# Patient Record
Sex: FEMALE | Race: WHITE | NOT HISPANIC OR LATINO | Employment: OTHER | URBAN - METROPOLITAN AREA
[De-identification: names, ages, dates, MRNs, and addresses within clinical notes are randomized per-mention and may not be internally consistent; named-entity substitution may affect disease eponyms.]

---

## 2020-07-03 ENCOUNTER — APPOINTMENT (OUTPATIENT)
Dept: RADIOLOGY | Facility: IMAGING CENTER | Age: 80
End: 2020-07-03
Attending: FAMILY MEDICINE
Payer: MEDICARE

## 2020-07-03 ENCOUNTER — OFFICE VISIT (OUTPATIENT)
Dept: URGENT CARE | Facility: CLINIC | Age: 80
End: 2020-07-03
Payer: MEDICARE

## 2020-07-03 VITALS
BODY MASS INDEX: 22.69 KG/M2 | RESPIRATION RATE: 14 BRPM | TEMPERATURE: 97.9 F | OXYGEN SATURATION: 94 % | DIASTOLIC BLOOD PRESSURE: 52 MMHG | SYSTOLIC BLOOD PRESSURE: 108 MMHG | WEIGHT: 132.9 LBS | HEART RATE: 68 BPM | HEIGHT: 64 IN

## 2020-07-03 DIAGNOSIS — S49.91XA INJURY OF RIGHT SHOULDER, INITIAL ENCOUNTER: ICD-10-CM

## 2020-07-03 DIAGNOSIS — Z23 NEED FOR VACCINE FOR DT (DIPHTHERIA-TETANUS): ICD-10-CM

## 2020-07-03 DIAGNOSIS — S01.81XA CHIN LACERATION, INITIAL ENCOUNTER: ICD-10-CM

## 2020-07-03 PROCEDURE — 90471 IMMUNIZATION ADMIN: CPT | Performed by: FAMILY MEDICINE

## 2020-07-03 PROCEDURE — 12011 RPR F/E/E/N/L/M 2.5 CM/<: CPT | Performed by: FAMILY MEDICINE

## 2020-07-03 PROCEDURE — 73030 X-RAY EXAM OF SHOULDER: CPT | Mod: TC,RT | Performed by: FAMILY MEDICINE

## 2020-07-03 PROCEDURE — 99213 OFFICE O/P EST LOW 20 MIN: CPT | Mod: 25 | Performed by: FAMILY MEDICINE

## 2020-07-03 PROCEDURE — 90714 TD VACC NO PRESV 7 YRS+ IM: CPT | Performed by: FAMILY MEDICINE

## 2020-07-03 RX ORDER — HYDROCORTISONE 20 MG/1
20 TABLET ORAL DAILY
COMMUNITY
End: 2020-07-04

## 2020-07-03 RX ORDER — CHLORTHALIDONE 25 MG/1
25 TABLET ORAL DAILY
Status: ON HOLD | COMMUNITY
End: 2021-02-24

## 2020-07-03 ASSESSMENT — ENCOUNTER SYMPTOMS
COUGH: 0
SENSORY CHANGE: 0
SORE THROAT: 0
FOCAL WEAKNESS: 1
VOMITING: 0
SHORTNESS OF BREATH: 0
FEVER: 0
TINGLING: 0
FALLS: 1
DIZZINESS: 0
HEADACHES: 0

## 2020-07-03 NOTE — PROGRESS NOTES
Subjective:     Amada Mcduffie is a 80 y.o. female who presents for Laceration (Chin x today; tripped over uneven brick; )     HPI  Pt presents for evaluation of a new problem   Pt with a laceration to her chin after a fall earlier today  Fall was a mechanical fall after tripping on uneven brick  Able to stop the bleeding on her own, however has concerned that she may need stitches as the cut appears pretty deep  Last tetanus unknown, and thinks it is many years ago    During the fall, patient also injured her right shoulder  Having pain in the anterior and posterior shoulder  Has difficulty raising her right shoulder since the injury  Pain stays localized in the shoulder and does not radiate  No significant bruises or lacerations on her shoulder that she is noticed  No headaches since the fall    Review of Systems   Constitutional: Negative for fever.   HENT: Negative for sore throat.    Respiratory: Negative for cough and shortness of breath.    Cardiovascular: Negative for chest pain.   Gastrointestinal: Negative for vomiting.   Musculoskeletal: Positive for falls and joint pain.   Skin: Negative for rash.   Neurological: Positive for focal weakness (Right shoulder ). Negative for dizziness, tingling, sensory change and headaches.     PMH:  has a past medical history of CATARACT, Hypertension, and Unspecified disorder of thyroid.  MEDS:   Current Outpatient Medications:   •  chlorthalidone (HYGROTON) 25 MG Tab, Take 25 mg by mouth every day., Disp: , Rfl:   •  lisinopril (PRINIVIL, ZESTRIL) 40 MG tablet, Take 1 Tab by mouth every day., Disp: 30 Tab, Rfl: 0  •  simvastatin (ZOCOR) 20 MG TABS, Take 1 Tab by mouth every evening., Disp: 30 Tab, Rfl: 0  •  carvedilol (COREG) 25 MG TABS, Take 1 Tab by mouth 2 times a day, with meals., Disp: 60 Tab, Rfl: 0  ALLERGIES:   Allergies   Allergen Reactions   • Morphine    • Pcn [Penicillins]      SURGHX:   Past Surgical History:   Procedure Laterality Date   • CRANIOTOMY   "1/1/2014    Performed by Melanie Pham M.D. at SURGERY [facility]   • GYN SURGERY      hysterectomy   • OTHER ORTHOPEDIC SURGERY      bilateral TKA     SOCHX:  reports that she has never smoked. She has never used smokeless tobacco. She reports that she does not drink alcohol or use drugs.  FH: Family history was reviewed, not contributing to acute injuries      Objective:   /52 (BP Location: Left arm, Patient Position: Sitting)   Pulse 68   Temp 36.6 °C (97.9 °F) (Temporal)   Resp 14   Ht 1.626 m (5' 4\")   Wt 60.3 kg (132 lb 14.4 oz)   SpO2 94%   BMI 22.81 kg/m²     Physical Exam  Constitutional:       General: She is not in acute distress.     Appearance: She is well-developed. She is not diaphoretic.   HENT:      Head: Normocephalic and atraumatic.   Musculoskeletal:      Comments: Right shoulder  General: no gross deformity  ROM: flex 80 active/180 passive, extension 50, ER 60, IR for vertebral height deficit compared to contralateral side  Palpation: TTP along anterior shoulder at coracoid process  Strength: 4/5 abduction, 4/5 ER, 5/5 IR, 5/5 subscapular lift  Rotator Cuff: Empty can +, External rotation lag -  Impingement: Neer’s +  Biceps: Speed’s +  Neuro: Sensation equal and intact bilaterally  Pulses: radial, ulnar 2+   Skin:     General: Skin is warm and dry.      Comments: 2 cm straight laceration over chin which is full-thickness with subcutaneous fat visible, no damage to deeper structures   Neurological:      Mental Status: She is alert and oriented to person, place, and time.   Psychiatric:         Behavior: Behavior normal.         Thought Content: Thought content normal.         Judgment: Judgment normal.     Laceration repair   Area cleaned with Betadine and anesthetized with 1% lidocaine without epinephrine.  Using 4 interrupted sutures of 5-0 Prolene, 2 cm straight laceration on chin repaired with hemostasis and no complications.  Patient tolerated procedure " well    Assessment/Plan:   Assessment    1. Chin laceration, initial encounter  2. Need for vaccine for DT (diphtheria-tetanus)  - TD =>6yo IM    Laceration repaired as above.  Patient will return in 7 to 10 days to have sutures removed.  Last tetanus many years ago and was updated today.  No signs of concussion or significant head injury on exam.      3. Injury of right shoulder, initial encounter  - DX-SHOULDER 2+ RIGHT; Future    Patient with right shoulder injury during the fall.  X-ray shows no fracture or dislocation.  She has significantly limited range of motion which may be at least in part due to pain.  However, high concern that this could be a large rotator cuff tear based on exam.  Patient given some range of motion exercises to start working on and will recheck her range of motion at suture removal visit.  If still having significant range of motion deficits at that visit, would recommend either MRI or orthopedic referral.

## 2020-07-10 ENCOUNTER — OFFICE VISIT (OUTPATIENT)
Dept: URGENT CARE | Facility: CLINIC | Age: 80
End: 2020-07-10
Payer: MEDICARE

## 2020-07-10 VITALS
HEART RATE: 64 BPM | SYSTOLIC BLOOD PRESSURE: 106 MMHG | RESPIRATION RATE: 16 BRPM | HEIGHT: 64 IN | WEIGHT: 132 LBS | OXYGEN SATURATION: 96 % | TEMPERATURE: 96.8 F | BODY MASS INDEX: 22.53 KG/M2 | DIASTOLIC BLOOD PRESSURE: 72 MMHG

## 2020-07-10 DIAGNOSIS — Z48.02 VISIT FOR SUTURE REMOVAL: ICD-10-CM

## 2020-07-10 PROCEDURE — 99212 OFFICE O/P EST SF 10 MIN: CPT | Performed by: PHYSICIAN ASSISTANT

## 2020-07-10 NOTE — PROGRESS NOTES
Exam: Suture/Staple Removal    Placed: 7 days ago    Area: chin    Number of staples/sutures: 4    Patient is seen for suture/staple removal from a laceration. # 4 sutures/staples are intact with no sign of infection or dehiscence. Sutures/staples were removed without incident. Area was covered with ointment and nonstick dressing and bandage.    Follow Up: As needed.

## 2020-07-10 NOTE — PATIENT INSTRUCTIONS
Shoulder Exercises  Ask your health care provider which exercises are safe for you. Do exercises exactly as told by your health care provider and adjust them as directed. It is normal to feel mild stretching, pulling, tightness, or discomfort as you do these exercises. Stop right away if you feel sudden pain or your pain gets worse. Do not begin these exercises until told by your health care provider.  Stretching exercises  External rotation and abduction  This exercise is sometimes called corner stretch. This exercise rotates your arm outward (external rotation) and moves your arm out from your body (abduction).  1.  a doorway with one of your feet slightly in front of the other. This is called a staggered stance. If you cannot reach your forearms to the door frame, stand facing a corner of a room.  2. Choose one of the following positions as told by your health care provider:  ? Place your hands and forearms on the door frame above your head.  ? Place your hands and forearms on the door frame at the height of your head.  ? Place your hands on the door frame at the height of your elbows.  3. Slowly move your weight onto your front foot until you feel a stretch across your chest and in the front of your shoulders. Keep your head and chest upright and keep your abdominal muscles tight.  4. Hold for __________ seconds.  5. To release the stretch, shift your weight to your back foot.  Repeat __________ times. Complete this exercise __________ times a day.  Extension, standing  1. Stand and hold a broomstick, a cane, or a similar object behind your back.  ? Your hands should be a little wider than shoulder width apart.  ? Your palms should face away from your back.  2. Keeping your elbows straight and your shoulder muscles relaxed, move the stick away from your body until you feel a stretch in your shoulders (extension).  ? Avoid shrugging your shoulders while you move the stick. Keep your shoulder blades tucked  down toward the middle of your back.  3. Hold for __________ seconds.  4. Slowly return to the starting position.  Repeat __________ times. Complete this exercise __________ times a day.  Range-of-motion exercises  Pendulum    1. Stand near a wall or a surface that you can hold onto for balance.  2. Bend at the waist and let your left / right arm hang straight down. Use your other arm to support you. Keep your back straight and do not lock your knees.  3. Relax your left / right arm and shoulder muscles, and move your hips and your trunk so your left / right arm swings freely. Your arm should swing because of the motion of your body, not because you are using your arm or shoulder muscles.  4. Keep moving your hips and trunk so your arm swings in the following directions, as told by your health care provider:  ? Side to side.  ? Forward and backward.  ? In clockwise and counterclockwise circles.  5. Continue each motion for __________ seconds, or for as long as told by your health care provider.  6. Slowly return to the starting position.  Repeat __________ times. Complete this exercise __________ times a day.  Shoulder flexion, standing    1. Stand and hold a broomstick, a cane, or a similar object. Place your hands a little more than shoulder width apart on the object. Your left / right hand should be palm up, and your other hand should be palm down.  2. Keep your elbow straight and your shoulder muscles relaxed. Push the stick up with your healthy arm to raise your left / right arm in front of your body, and then over your head until you feel a stretch in your shoulder (flexion).  ? Avoid shrugging your shoulder while you raise your arm. Keep your shoulder blade tucked down toward the middle of your back.  3. Hold for __________ seconds.  4. Slowly return to the starting position.  Repeat __________ times. Complete this exercise __________ times a day.  Shoulder abduction, standing  1. Stand and hold a broomstick,  a cane, or a similar object. Place your hands a little more than shoulder width apart on the object. Your left / right hand should be palm up, and your other hand should be palm down.  2. Keep your elbow straight and your shoulder muscles relaxed. Push the object across your body toward your left / right side. Raise your left / right arm to the side of your body (abduction) until you feel a stretch in your shoulder.  ? Do not raise your arm above shoulder height unless your health care provider tells you to do that.  ? If directed, raise your arm over your head.  ? Avoid shrugging your shoulder while you raise your arm. Keep your shoulder blade tucked down toward the middle of your back.  3. Hold for __________ seconds.  4. Slowly return to the starting position.  Repeat __________ times. Complete this exercise __________ times a day.  Internal rotation    1. Place your left / right hand behind your back, palm up.  2. Use your other hand to dangle an exercise band, a towel, or a similar object over your shoulder. Grasp the band with your left / right hand so you are holding on to both ends.  3. Gently pull up on the band until you feel a stretch in the front of your left / right shoulder. The movement of your arm toward the center of your body is called internal rotation.  ? Avoid shrugging your shoulder while you raise your arm. Keep your shoulder blade tucked down toward the middle of your back.  4. Hold for __________ seconds.  5. Release the stretch by letting go of the band and lowering your hands.  Repeat __________ times. Complete this exercise __________ times a day.  Strengthening exercises  External rotation    1. Sit in a stable chair without armrests.  2. Secure an exercise band to a stable object at elbow height on your left / right side.  3. Place a soft object, such as a folded towel or a small pillow, between your left / right upper arm and your body to move your elbow about 4 inches (10 cm) away  from your side.  4. Hold the end of the exercise band so it is tight and there is no slack.  5. Keeping your elbow pressed against the soft object, slowly move your forearm out, away from your abdomen (external rotation). Keep your body steady so only your forearm moves.  6. Hold for __________ seconds.  7. Slowly return to the starting position.  Repeat __________ times. Complete this exercise __________ times a day.  Shoulder abduction    1. Sit in a stable chair without armrests, or stand up.  2. Hold a __________ weight in your left / right hand, or hold an exercise band with both hands.  3. Start with your arms straight down and your left / right palm facing in, toward your body.  4. Slowly lift your left / right hand out to your side (abduction). Do not lift your hand above shoulder height unless your health care provider tells you that this is safe.  ? Keep your arms straight.  ? Avoid shrugging your shoulder while you do this movement. Keep your shoulder blade tucked down toward the middle of your back.  5. Hold for __________ seconds.  6. Slowly lower your arm, and return to the starting position.  Repeat __________ times. Complete this exercise __________ times a day.  Shoulder extension  1. Sit in a stable chair without armrests, or stand up.  2. Secure an exercise band to a stable object in front of you so it is at shoulder height.  3. Hold one end of the exercise band in each hand. Your palms should face each other.  4. Straighten your elbows and lift your hands up to shoulder height.  5. Step back, away from the secured end of the exercise band, until the band is tight and there is no slack.  6. Squeeze your shoulder blades together as you pull your hands down to the sides of your thighs (extension). Stop when your hands are straight down by your sides. Do not let your hands go behind your body.  7. Hold for __________ seconds.  8. Slowly return to the starting position.  Repeat __________ times.  Complete this exercise __________ times a day.  Shoulder row  1. Sit in a stable chair without armrests, or stand up.  2. Secure an exercise band to a stable object in front of you so it is at waist height.  3. Hold one end of the exercise band in each hand. Position your palms so that your thumbs are facing the ceiling (neutral position).  4. Bend each of your elbows to a 90-degree angle (right angle) and keep your upper arms at your sides.  5. Step back until the band is tight and there is no slack.  6. Slowly pull your elbows back behind you.  7. Hold for __________ seconds.  8. Slowly return to the starting position.  Repeat __________ times. Complete this exercise __________ times a day.  Shoulder press-ups    1. Sit in a stable chair that has armrests. Sit upright, with your feet flat on the floor.  2. Put your hands on the armrests so your elbows are bent and your fingers are pointing forward. Your hands should be about even with the sides of your body.  3. Push down on the armrests and use your arms to lift yourself off the chair. Straighten your elbows and lift yourself up as much as you comfortably can.  ? Move your shoulder blades down, and avoid letting your shoulders move up toward your ears.  ? Keep your feet on the ground. As you get stronger, your feet should support less of your body weight as you lift yourself up.  4. Hold for __________ seconds.  5. Slowly lower yourself back into the chair.  Repeat __________ times. Complete this exercise __________ times a day.  Wall push-ups    1. Stand so you are facing a stable wall. Your feet should be about one arm-length away from the wall.  2. Lean forward and place your palms on the wall at shoulder height.  3. Keep your feet flat on the floor as you bend your elbows and lean forward toward the wall.  4. Hold for __________ seconds.  5. Straighten your elbows to push yourself back to the starting position.  Repeat __________ times. Complete this exercise  __________ times a day.  This information is not intended to replace advice given to you by your health care provider. Make sure you discuss any questions you have with your health care provider.  Document Released: 11/01/2006 Document Revised: 04/10/2020 Document Reviewed: 01/17/2020  Elsevier Patient Education © 2020 [employer]

## 2021-02-10 ENCOUNTER — PRE-ADMISSION TESTING (OUTPATIENT)
Dept: ADMISSIONS | Facility: MEDICAL CENTER | Age: 81
End: 2021-02-10
Attending: SURGERY
Payer: MEDICARE

## 2021-02-10 DIAGNOSIS — Z01.812 PRE-OPERATIVE LABORATORY EXAMINATION: ICD-10-CM

## 2021-02-10 DIAGNOSIS — Z01.810 PRE-OPERATIVE CARDIOVASCULAR EXAMINATION: ICD-10-CM

## 2021-02-10 LAB
ANION GAP SERPL CALC-SCNC: 8 MMOL/L (ref 7–16)
BUN SERPL-MCNC: 15 MG/DL (ref 8–22)
CALCIUM SERPL-MCNC: 9.5 MG/DL (ref 8.5–10.5)
CHLORIDE SERPL-SCNC: 94 MMOL/L (ref 96–112)
CO2 SERPL-SCNC: 30 MMOL/L (ref 20–33)
CREAT SERPL-MCNC: 0.92 MG/DL (ref 0.5–1.4)
EKG IMPRESSION: NORMAL
ERYTHROCYTE [DISTWIDTH] IN BLOOD BY AUTOMATED COUNT: 48.6 FL (ref 35.9–50)
GLUCOSE SERPL-MCNC: 102 MG/DL (ref 65–99)
HCT VFR BLD AUTO: 40.5 % (ref 37–47)
HGB BLD-MCNC: 13.5 G/DL (ref 12–16)
MCH RBC QN AUTO: 31 PG (ref 27–33)
MCHC RBC AUTO-ENTMCNC: 33.3 G/DL (ref 33.6–35)
MCV RBC AUTO: 93.1 FL (ref 81.4–97.8)
PLATELET # BLD AUTO: 249 K/UL (ref 164–446)
PMV BLD AUTO: 10.4 FL (ref 9–12.9)
POTASSIUM SERPL-SCNC: 3.5 MMOL/L (ref 3.6–5.5)
RBC # BLD AUTO: 4.35 M/UL (ref 4.2–5.4)
SODIUM SERPL-SCNC: 132 MMOL/L (ref 135–145)
WBC # BLD AUTO: 4.2 K/UL (ref 4.8–10.8)

## 2021-02-10 PROCEDURE — 36415 COLL VENOUS BLD VENIPUNCTURE: CPT

## 2021-02-10 PROCEDURE — 93010 ELECTROCARDIOGRAM REPORT: CPT | Performed by: INTERNAL MEDICINE

## 2021-02-10 PROCEDURE — 85027 COMPLETE CBC AUTOMATED: CPT

## 2021-02-10 PROCEDURE — 93005 ELECTROCARDIOGRAM TRACING: CPT

## 2021-02-10 PROCEDURE — 80048 BASIC METABOLIC PNL TOTAL CA: CPT

## 2021-02-10 RX ORDER — ANTIARTHRITIC COMBINATION NO.2 900 MG
25 TABLET ORAL DAILY
COMMUNITY

## 2021-02-10 RX ORDER — FUROSEMIDE 20 MG/1
40 TABLET ORAL 2 TIMES DAILY
Status: ON HOLD | COMMUNITY
End: 2021-02-28

## 2021-02-10 RX ORDER — CALCIUM CARBONATE/VITAMIN D3 600 MG-10
2 TABLET ORAL DAILY
COMMUNITY
End: 2021-11-09

## 2021-02-22 ENCOUNTER — APPOINTMENT (OUTPATIENT)
Dept: ADMISSIONS | Facility: MEDICAL CENTER | Age: 81
End: 2021-02-22
Payer: MEDICARE

## 2021-02-22 ENCOUNTER — PRE-ADMISSION TESTING (OUTPATIENT)
Dept: ADMISSIONS | Facility: MEDICAL CENTER | Age: 81
End: 2021-02-22
Attending: SURGERY
Payer: MEDICARE

## 2021-02-22 DIAGNOSIS — Z01.812 PRE-OPERATIVE LABORATORY EXAMINATION: ICD-10-CM

## 2021-02-22 LAB
COVID ORDER STATUS COVID19: NORMAL
SARS-COV-2 RNA RESP QL NAA+PROBE: NOTDETECTED
SPECIMEN SOURCE: NORMAL

## 2021-02-22 PROCEDURE — C9803 HOPD COVID-19 SPEC COLLECT: HCPCS

## 2021-02-22 PROCEDURE — U0005 INFEC AGEN DETEC AMPLI PROBE: HCPCS

## 2021-02-22 PROCEDURE — U0003 INFECTIOUS AGENT DETECTION BY NUCLEIC ACID (DNA OR RNA); SEVERE ACUTE RESPIRATORY SYNDROME CORONAVIRUS 2 (SARS-COV-2) (CORONAVIRUS DISEASE [COVID-19]), AMPLIFIED PROBE TECHNIQUE, MAKING USE OF HIGH THROUGHPUT TECHNOLOGIES AS DESCRIBED BY CMS-2020-01-R: HCPCS

## 2021-02-24 ENCOUNTER — ANESTHESIA EVENT (OUTPATIENT)
Dept: SURGERY | Facility: MEDICAL CENTER | Age: 81
End: 2021-02-24
Payer: MEDICARE

## 2021-02-24 ENCOUNTER — HOSPITAL ENCOUNTER (OUTPATIENT)
Facility: MEDICAL CENTER | Age: 81
End: 2021-02-25
Attending: SURGERY | Admitting: SURGERY
Payer: MEDICARE

## 2021-02-24 ENCOUNTER — ANESTHESIA (OUTPATIENT)
Dept: SURGERY | Facility: MEDICAL CENTER | Age: 81
End: 2021-02-24
Payer: MEDICARE

## 2021-02-24 ENCOUNTER — APPOINTMENT (OUTPATIENT)
Dept: RADIOLOGY | Facility: MEDICAL CENTER | Age: 81
End: 2021-02-24
Attending: SURGERY
Payer: MEDICARE

## 2021-02-24 PROCEDURE — A9270 NON-COVERED ITEM OR SERVICE: HCPCS | Performed by: SURGERY

## 2021-02-24 PROCEDURE — 73020 X-RAY EXAM OF SHOULDER: CPT | Mod: RT

## 2021-02-24 PROCEDURE — 700105 HCHG RX REV CODE 258: Performed by: SURGERY

## 2021-02-24 PROCEDURE — 501838 HCHG SUTURE GENERAL: Performed by: SURGERY

## 2021-02-24 PROCEDURE — 160029 HCHG SURGERY MINUTES - 1ST 30 MINS LEVEL 4: Performed by: SURGERY

## 2021-02-24 PROCEDURE — 160048 HCHG OR STATISTICAL LEVEL 1-5: Performed by: SURGERY

## 2021-02-24 PROCEDURE — 500367 HCHG DRAIN KIT, HEMOVAC: Performed by: SURGERY

## 2021-02-24 PROCEDURE — 700102 HCHG RX REV CODE 250 W/ 637 OVERRIDE(OP): Performed by: SURGERY

## 2021-02-24 PROCEDURE — 770006 HCHG ROOM/CARE - MED/SURG/GYN SEMI*

## 2021-02-24 PROCEDURE — C1776 JOINT DEVICE (IMPLANTABLE): HCPCS | Performed by: SURGERY

## 2021-02-24 PROCEDURE — 502000 HCHG MISC OR IMPLANTS RC 0278: Performed by: SURGERY

## 2021-02-24 PROCEDURE — 502240 HCHG MISC OR SUPPLY RC 0272: Performed by: SURGERY

## 2021-02-24 PROCEDURE — 160002 HCHG RECOVERY MINUTES (STAT): Performed by: SURGERY

## 2021-02-24 PROCEDURE — 700101 HCHG RX REV CODE 250: Performed by: SURGERY

## 2021-02-24 PROCEDURE — 700101 HCHG RX REV CODE 250: Performed by: ANESTHESIOLOGY

## 2021-02-24 PROCEDURE — 700111 HCHG RX REV CODE 636 W/ 250 OVERRIDE (IP): Performed by: ANESTHESIOLOGY

## 2021-02-24 PROCEDURE — 700102 HCHG RX REV CODE 250 W/ 637 OVERRIDE(OP): Performed by: ANESTHESIOLOGY

## 2021-02-24 PROCEDURE — A4565 SLINGS: HCPCS | Performed by: SURGERY

## 2021-02-24 PROCEDURE — 502578 HCHG PACK, TOTAL HIP: Performed by: SURGERY

## 2021-02-24 PROCEDURE — 160035 HCHG PACU - 1ST 60 MINS PHASE I: Performed by: SURGERY

## 2021-02-24 PROCEDURE — A9270 NON-COVERED ITEM OR SERVICE: HCPCS | Performed by: ANESTHESIOLOGY

## 2021-02-24 PROCEDURE — 160009 HCHG ANES TIME/MIN: Performed by: SURGERY

## 2021-02-24 PROCEDURE — 160041 HCHG SURGERY MINUTES - EA ADDL 1 MIN LEVEL 4: Performed by: SURGERY

## 2021-02-24 PROCEDURE — 700111 HCHG RX REV CODE 636 W/ 250 OVERRIDE (IP): Performed by: SURGERY

## 2021-02-24 PROCEDURE — 64415 NJX AA&/STRD BRCH PLXS IMG: CPT | Performed by: SURGERY

## 2021-02-24 PROCEDURE — 160036 HCHG PACU - EA ADDL 30 MINS PHASE I: Performed by: SURGERY

## 2021-02-24 PROCEDURE — C1713 ANCHOR/SCREW BN/BN,TIS/BN: HCPCS | Performed by: SURGERY

## 2021-02-24 DEVICE — IMPLANTABLE DEVICE: Type: IMPLANTABLE DEVICE | Site: SHOULDER | Status: FUNCTIONAL

## 2021-02-24 RX ORDER — ROPIVACAINE HYDROCHLORIDE 5 MG/ML
INJECTION, SOLUTION EPIDURAL; INFILTRATION; PERINEURAL
Status: COMPLETED | OUTPATIENT
Start: 2021-02-24 | End: 2021-02-24

## 2021-02-24 RX ORDER — HYDROMORPHONE HYDROCHLORIDE 1 MG/ML
0.4 INJECTION, SOLUTION INTRAMUSCULAR; INTRAVENOUS; SUBCUTANEOUS
Status: DISCONTINUED | OUTPATIENT
Start: 2021-02-24 | End: 2021-02-24 | Stop reason: HOSPADM

## 2021-02-24 RX ORDER — DEXAMETHASONE SODIUM PHOSPHATE 4 MG/ML
4 INJECTION, SOLUTION INTRA-ARTICULAR; INTRALESIONAL; INTRAMUSCULAR; INTRAVENOUS; SOFT TISSUE
Status: DISCONTINUED | OUTPATIENT
Start: 2021-02-24 | End: 2021-02-25 | Stop reason: HOSPADM

## 2021-02-24 RX ORDER — ACETAMINOPHEN 500 MG
1000 TABLET ORAL ONCE
Status: COMPLETED | OUTPATIENT
Start: 2021-02-24 | End: 2021-02-24

## 2021-02-24 RX ORDER — OXYCODONE HYDROCHLORIDE 10 MG/1
10 TABLET ORAL
Status: DISCONTINUED | OUTPATIENT
Start: 2021-02-24 | End: 2021-02-25 | Stop reason: HOSPADM

## 2021-02-24 RX ORDER — OXYCODONE HYDROCHLORIDE 5 MG/1
5 TABLET ORAL
Status: DISCONTINUED | OUTPATIENT
Start: 2021-02-24 | End: 2021-02-25 | Stop reason: HOSPADM

## 2021-02-24 RX ORDER — ACETAMINOPHEN 500 MG
1000 TABLET ORAL EVERY 6 HOURS PRN
Status: DISCONTINUED | OUTPATIENT
Start: 2021-03-01 | End: 2021-02-25 | Stop reason: HOSPADM

## 2021-02-24 RX ORDER — MIDAZOLAM HYDROCHLORIDE 1 MG/ML
1 INJECTION INTRAMUSCULAR; INTRAVENOUS
Status: DISCONTINUED | OUTPATIENT
Start: 2021-02-24 | End: 2021-02-24 | Stop reason: HOSPADM

## 2021-02-24 RX ORDER — CELECOXIB 200 MG/1
200 CAPSULE ORAL
Status: DISCONTINUED | OUTPATIENT
Start: 2021-03-01 | End: 2021-02-25 | Stop reason: HOSPADM

## 2021-02-24 RX ORDER — POLYETHYLENE GLYCOL 3350 17 G/17G
1 POWDER, FOR SOLUTION ORAL 2 TIMES DAILY PRN
Status: DISCONTINUED | OUTPATIENT
Start: 2021-02-24 | End: 2021-02-25 | Stop reason: HOSPADM

## 2021-02-24 RX ORDER — ONDANSETRON 2 MG/ML
4 INJECTION INTRAMUSCULAR; INTRAVENOUS
Status: DISCONTINUED | OUTPATIENT
Start: 2021-02-24 | End: 2021-02-24 | Stop reason: HOSPADM

## 2021-02-24 RX ORDER — LIDOCAINE HYDROCHLORIDE 40 MG/ML
SOLUTION TOPICAL PRN
Status: DISCONTINUED | OUTPATIENT
Start: 2021-02-24 | End: 2021-02-24 | Stop reason: SURG

## 2021-02-24 RX ORDER — ENEMA 19; 7 G/133ML; G/133ML
1 ENEMA RECTAL
Status: DISCONTINUED | OUTPATIENT
Start: 2021-02-24 | End: 2021-02-25 | Stop reason: HOSPADM

## 2021-02-24 RX ORDER — ONDANSETRON 2 MG/ML
INJECTION INTRAMUSCULAR; INTRAVENOUS PRN
Status: DISCONTINUED | OUTPATIENT
Start: 2021-02-24 | End: 2021-02-24 | Stop reason: SURG

## 2021-02-24 RX ORDER — AMOXICILLIN 250 MG
1 CAPSULE ORAL
Status: DISCONTINUED | OUTPATIENT
Start: 2021-02-24 | End: 2021-02-25 | Stop reason: HOSPADM

## 2021-02-24 RX ORDER — DOCUSATE SODIUM 100 MG/1
100 CAPSULE, LIQUID FILLED ORAL 2 TIMES DAILY
Status: DISCONTINUED | OUTPATIENT
Start: 2021-02-24 | End: 2021-02-25 | Stop reason: HOSPADM

## 2021-02-24 RX ORDER — HALOPERIDOL 5 MG/ML
1 INJECTION INTRAMUSCULAR EVERY 6 HOURS PRN
Status: DISCONTINUED | OUTPATIENT
Start: 2021-02-24 | End: 2021-02-25 | Stop reason: HOSPADM

## 2021-02-24 RX ORDER — AMOXICILLIN 250 MG
1 CAPSULE ORAL NIGHTLY
Status: DISCONTINUED | OUTPATIENT
Start: 2021-02-24 | End: 2021-02-25 | Stop reason: HOSPADM

## 2021-02-24 RX ORDER — ACETAMINOPHEN 500 MG
1000 TABLET ORAL EVERY 6 HOURS
Status: DISCONTINUED | OUTPATIENT
Start: 2021-02-24 | End: 2021-02-25 | Stop reason: HOSPADM

## 2021-02-24 RX ORDER — CEFAZOLIN SODIUM 1 G/3ML
INJECTION, POWDER, FOR SOLUTION INTRAMUSCULAR; INTRAVENOUS PRN
Status: DISCONTINUED | OUTPATIENT
Start: 2021-02-24 | End: 2021-02-24 | Stop reason: SURG

## 2021-02-24 RX ORDER — LIDOCAINE HYDROCHLORIDE 20 MG/ML
JELLY TOPICAL PRN
Status: DISCONTINUED | OUTPATIENT
Start: 2021-02-24 | End: 2021-02-24 | Stop reason: SURG

## 2021-02-24 RX ORDER — HALOPERIDOL 5 MG/ML
1 INJECTION INTRAMUSCULAR
Status: DISCONTINUED | OUTPATIENT
Start: 2021-02-24 | End: 2021-02-24 | Stop reason: HOSPADM

## 2021-02-24 RX ORDER — HYDROMORPHONE HYDROCHLORIDE 1 MG/ML
0.1 INJECTION, SOLUTION INTRAMUSCULAR; INTRAVENOUS; SUBCUTANEOUS
Status: DISCONTINUED | OUTPATIENT
Start: 2021-02-24 | End: 2021-02-24 | Stop reason: HOSPADM

## 2021-02-24 RX ORDER — DIPHENHYDRAMINE HYDROCHLORIDE 50 MG/ML
12.5 INJECTION INTRAMUSCULAR; INTRAVENOUS
Status: DISCONTINUED | OUTPATIENT
Start: 2021-02-24 | End: 2021-02-24 | Stop reason: HOSPADM

## 2021-02-24 RX ORDER — HYDROMORPHONE HYDROCHLORIDE 1 MG/ML
0.2 INJECTION, SOLUTION INTRAMUSCULAR; INTRAVENOUS; SUBCUTANEOUS
Status: DISCONTINUED | OUTPATIENT
Start: 2021-02-24 | End: 2021-02-24 | Stop reason: HOSPADM

## 2021-02-24 RX ORDER — SODIUM CHLORIDE, SODIUM LACTATE, POTASSIUM CHLORIDE, CALCIUM CHLORIDE 600; 310; 30; 20 MG/100ML; MG/100ML; MG/100ML; MG/100ML
INJECTION, SOLUTION INTRAVENOUS CONTINUOUS
Status: ACTIVE | OUTPATIENT
Start: 2021-02-24 | End: 2021-02-24

## 2021-02-24 RX ORDER — CLINDAMYCIN PHOSPHATE 150 MG/ML
INJECTION, SOLUTION INTRAVENOUS PRN
Status: DISCONTINUED | OUTPATIENT
Start: 2021-02-24 | End: 2021-02-24 | Stop reason: SURG

## 2021-02-24 RX ORDER — ONDANSETRON 2 MG/ML
4 INJECTION INTRAMUSCULAR; INTRAVENOUS EVERY 4 HOURS PRN
Status: DISCONTINUED | OUTPATIENT
Start: 2021-02-24 | End: 2021-02-25 | Stop reason: HOSPADM

## 2021-02-24 RX ORDER — DIPHENHYDRAMINE HYDROCHLORIDE 50 MG/ML
25 INJECTION INTRAMUSCULAR; INTRAVENOUS EVERY 6 HOURS PRN
Status: DISCONTINUED | OUTPATIENT
Start: 2021-02-24 | End: 2021-02-25 | Stop reason: HOSPADM

## 2021-02-24 RX ORDER — ROCURONIUM BROMIDE 10 MG/ML
INJECTION, SOLUTION INTRAVENOUS PRN
Status: DISCONTINUED | OUTPATIENT
Start: 2021-02-24 | End: 2021-02-24 | Stop reason: SURG

## 2021-02-24 RX ORDER — CELECOXIB 200 MG/1
200 CAPSULE ORAL DAILY
Status: DISCONTINUED | OUTPATIENT
Start: 2021-02-24 | End: 2021-02-25 | Stop reason: HOSPADM

## 2021-02-24 RX ORDER — DEXAMETHASONE SODIUM PHOSPHATE 4 MG/ML
INJECTION, SOLUTION INTRA-ARTICULAR; INTRALESIONAL; INTRAMUSCULAR; INTRAVENOUS; SOFT TISSUE
Status: COMPLETED | OUTPATIENT
Start: 2021-02-24 | End: 2021-02-24

## 2021-02-24 RX ORDER — SCOLOPAMINE TRANSDERMAL SYSTEM 1 MG/1
1 PATCH, EXTENDED RELEASE TRANSDERMAL
Status: DISCONTINUED | OUTPATIENT
Start: 2021-02-24 | End: 2021-02-25 | Stop reason: HOSPADM

## 2021-02-24 RX ORDER — TRANEXAMIC ACID 100 MG/ML
INJECTION, SOLUTION INTRAVENOUS PRN
Status: DISCONTINUED | OUTPATIENT
Start: 2021-02-24 | End: 2021-02-24 | Stop reason: SURG

## 2021-02-24 RX ORDER — SODIUM CHLORIDE, SODIUM LACTATE, POTASSIUM CHLORIDE, CALCIUM CHLORIDE 600; 310; 30; 20 MG/100ML; MG/100ML; MG/100ML; MG/100ML
INJECTION, SOLUTION INTRAVENOUS CONTINUOUS
Status: DISCONTINUED | OUTPATIENT
Start: 2021-02-24 | End: 2021-02-24 | Stop reason: HOSPADM

## 2021-02-24 RX ORDER — BISACODYL 10 MG
10 SUPPOSITORY, RECTAL RECTAL
Status: DISCONTINUED | OUTPATIENT
Start: 2021-02-24 | End: 2021-02-25 | Stop reason: HOSPADM

## 2021-02-24 RX ORDER — CEFAZOLIN SODIUM 2 G/100ML
2 INJECTION, SOLUTION INTRAVENOUS ONCE
Status: COMPLETED | OUTPATIENT
Start: 2021-02-24 | End: 2021-02-24

## 2021-02-24 RX ORDER — MEPERIDINE HYDROCHLORIDE 25 MG/ML
12.5 INJECTION INTRAMUSCULAR; INTRAVENOUS; SUBCUTANEOUS
Status: DISCONTINUED | OUTPATIENT
Start: 2021-02-24 | End: 2021-02-24 | Stop reason: HOSPADM

## 2021-02-24 RX ORDER — LIDOCAINE HYDROCHLORIDE 20 MG/ML
INJECTION, SOLUTION EPIDURAL; INFILTRATION; INTRACAUDAL; PERINEURAL PRN
Status: DISCONTINUED | OUTPATIENT
Start: 2021-02-24 | End: 2021-02-24 | Stop reason: SURG

## 2021-02-24 RX ADMIN — LIDOCAINE HYDROCHLORIDE 5 ML: 20 INJECTION, SOLUTION EPIDURAL; INFILTRATION; INTRACAUDAL at 10:13

## 2021-02-24 RX ADMIN — SODIUM CHLORIDE, POTASSIUM CHLORIDE, SODIUM LACTATE AND CALCIUM CHLORIDE: 600; 310; 30; 20 INJECTION, SOLUTION INTRAVENOUS at 09:11

## 2021-02-24 RX ADMIN — ROPIVACAINE HYDROCHLORIDE 20 ML: 5 INJECTION, SOLUTION EPIDURAL; INFILTRATION; PERINEURAL at 09:55

## 2021-02-24 RX ADMIN — DEXAMETHASONE SODIUM PHOSPHATE 4 MG: 4 INJECTION, SOLUTION INTRA-ARTICULAR; INTRALESIONAL; INTRAMUSCULAR; INTRAVENOUS; SOFT TISSUE at 10:33

## 2021-02-24 RX ADMIN — FENTANYL CITRATE 50 MCG: 50 INJECTION, SOLUTION INTRAMUSCULAR; INTRAVENOUS at 09:55

## 2021-02-24 RX ADMIN — ROCURONIUM BROMIDE 50 MG: 10 INJECTION, SOLUTION INTRAVENOUS at 10:13

## 2021-02-24 RX ADMIN — DOCUSATE SODIUM 100 MG: 100 CAPSULE, LIQUID FILLED ORAL at 17:40

## 2021-02-24 RX ADMIN — CEFAZOLIN SODIUM 2 G: 2 INJECTION, SOLUTION INTRAVENOUS at 14:52

## 2021-02-24 RX ADMIN — CELECOXIB 200 MG: 200 CAPSULE ORAL at 14:47

## 2021-02-24 RX ADMIN — POVIDONE IODINE 15 ML: 100 SOLUTION TOPICAL at 09:11

## 2021-02-24 RX ADMIN — ACETAMINOPHEN 1000 MG: 500 TABLET ORAL at 20:13

## 2021-02-24 RX ADMIN — OXYCODONE 5 MG: 5 TABLET ORAL at 17:40

## 2021-02-24 RX ADMIN — LIDOCAINE HYDROCHLORIDE 4 ML: 20 JELLY TOPICAL at 10:14

## 2021-02-24 RX ADMIN — CLINDAMYCIN PHOSPHATE 900 MG: 150 INJECTION, SOLUTION INTRAMUSCULAR; INTRAVENOUS at 10:09

## 2021-02-24 RX ADMIN — ONDANSETRON 4 MG: 2 INJECTION INTRAMUSCULAR; INTRAVENOUS at 10:29

## 2021-02-24 RX ADMIN — ACETAMINOPHEN 1000 MG: 500 TABLET ORAL at 09:10

## 2021-02-24 RX ADMIN — FENTANYL CITRATE 25 MCG: 50 INJECTION, SOLUTION INTRAMUSCULAR; INTRAVENOUS at 11:57

## 2021-02-24 RX ADMIN — TRANEXAMIC ACID 1000 MG: 100 INJECTION, SOLUTION INTRAVENOUS at 10:15

## 2021-02-24 RX ADMIN — DOCUSATE SODIUM 50 MG AND SENNOSIDES 8.6 MG 1 TABLET: 8.6; 5 TABLET, FILM COATED ORAL at 20:13

## 2021-02-24 RX ADMIN — CEFAZOLIN 2 G: 330 INJECTION, POWDER, FOR SOLUTION INTRAMUSCULAR; INTRAVENOUS at 10:19

## 2021-02-24 RX ADMIN — ACETAMINOPHEN 1000 MG: 500 TABLET ORAL at 14:46

## 2021-02-24 RX ADMIN — DEXAMETHASONE SODIUM PHOSPHATE 4 MG: 4 INJECTION, SOLUTION INTRA-ARTICULAR; INTRALESIONAL; INTRAMUSCULAR; INTRAVENOUS; SOFT TISSUE at 09:55

## 2021-02-24 RX ADMIN — FENTANYL CITRATE 25 MCG: 50 INJECTION, SOLUTION INTRAMUSCULAR; INTRAVENOUS at 11:45

## 2021-02-24 RX ADMIN — LIDOCAINE HYDROCHLORIDE 4 ML: 40 SOLUTION TOPICAL at 10:14

## 2021-02-24 RX ADMIN — PROPOFOL 150 MG: 10 INJECTION, EMULSION INTRAVENOUS at 10:13

## 2021-02-24 ASSESSMENT — COGNITIVE AND FUNCTIONAL STATUS - GENERAL
DAILY ACTIVITIY SCORE: 20
SUGGESTED CMS G CODE MODIFIER MOBILITY: CJ
WALKING IN HOSPITAL ROOM: A LITTLE
MOBILITY SCORE: 20
CLIMB 3 TO 5 STEPS WITH RAILING: A LITTLE
HELP NEEDED FOR BATHING: A LITTLE
TOILETING: A LITTLE
MOVING TO AND FROM BED TO CHAIR: A LITTLE
DRESSING REGULAR UPPER BODY CLOTHING: A LITTLE
DRESSING REGULAR LOWER BODY CLOTHING: A LITTLE
SUGGESTED CMS G CODE MODIFIER DAILY ACTIVITY: CJ
STANDING UP FROM CHAIR USING ARMS: A LITTLE

## 2021-02-24 ASSESSMENT — PAIN DESCRIPTION - PAIN TYPE
TYPE: SURGICAL PAIN
TYPE: ACUTE PAIN
TYPE: SURGICAL PAIN
TYPE: SURGICAL PAIN
TYPE: ACUTE PAIN
TYPE: ACUTE PAIN
TYPE: SURGICAL PAIN
TYPE: SURGICAL PAIN

## 2021-02-24 ASSESSMENT — PATIENT HEALTH QUESTIONNAIRE - PHQ9
SUM OF ALL RESPONSES TO PHQ9 QUESTIONS 1 AND 2: 0
1. LITTLE INTEREST OR PLEASURE IN DOING THINGS: NOT AT ALL
2. FEELING DOWN, DEPRESSED, IRRITABLE, OR HOPELESS: NOT AT ALL

## 2021-02-24 ASSESSMENT — PAIN SCALES - GENERAL: PAIN_LEVEL: 0

## 2021-02-24 ASSESSMENT — LIFESTYLE VARIABLES
TOTAL SCORE: 0
TOTAL SCORE: 0
HOW MANY TIMES IN THE PAST YEAR HAVE YOU HAD 5 OR MORE DRINKS IN A DAY: 0
ON A TYPICAL DAY WHEN YOU DRINK ALCOHOL HOW MANY DRINKS DO YOU HAVE: 0
CONSUMPTION TOTAL: NEGATIVE
EVER FELT BAD OR GUILTY ABOUT YOUR DRINKING: NO
ALCOHOL_USE: NO
EVER HAD A DRINK FIRST THING IN THE MORNING TO STEADY YOUR NERVES TO GET RID OF A HANGOVER: NO
TOTAL SCORE: 0
HAVE PEOPLE ANNOYED YOU BY CRITICIZING YOUR DRINKING: NO
AVERAGE NUMBER OF DAYS PER WEEK YOU HAVE A DRINK CONTAINING ALCOHOL: 0
DOES PATIENT WANT TO STOP DRINKING: NO
HAVE YOU EVER FELT YOU SHOULD CUT DOWN ON YOUR DRINKING: NO

## 2021-02-24 NOTE — ANESTHESIA PROCEDURE NOTES
Airway    Date/Time: 2/24/2021 10:14 AM  Performed by: Marcos Crews D.O.  Authorized by: Marcos Crews D.O.     Location:  OR  Urgency:  Elective  Indications for Airway Management:  Anesthesia      Spontaneous Ventilation: absent    Sedation Level:  Deep  Preoxygenated: Yes    Patient Position:  Sniffing  Final Airway Type:  Endotracheal airway  Final Endotracheal Airway:  ETT  Cuffed: Yes    Technique Used for Successful ETT Placement:  Direct laryngoscopy    Insertion Site:  Oral  Blade Type:  [place]  Laryngoscope Blade/Videolaryngoscope Blade Size:  3  ETT Size (mm):  7.0  Measured from:  Teeth  ETT to Teeth (cm):  21  Placement Verified by: auscultation and capnometry    Cormack-Lehane Classification:  Grade I - full view of glottis  Number of Attempts at Approach:  1

## 2021-02-24 NOTE — OR NURSING
1126-Pt arrived from OR. Report received. Pt oxygenating well on 4L mask. VSS. Left shoulder dressed with gauze and transparent film. Hemovac in place. Dressing CDI. Per anesthesia pt has known LBBB.    1145- Pt reporting pain. 25mcg fentanyl given. Pt taking sips of water    1157-Pt reporting pain. 25mcg fentanyl given.    1224- Pt eating jello and drinking water.     1243- Pts daughter called and updated.    1250-Report given to SULAIMAN Singleton. All questions answered at this time.    1300-Pt resting comfortably.    1327-Pt transported to new room. All personal belongings with pt.  
COVID-19 Pre-surgery screenin. Do you have an undiagnosed respiratory illness or symptoms such as coughing or sneezing? NO (Yes/No)  a. Onset of Sx -  b. Acute vs. chronic respiratory illness -    2. Do you have an unexplained fever greater than 100.4 degrees Fahrenheit or 38 degrees Celsius?     NO (Yes/No)    3. Have you had direct exposure to a patient who tested positive for Covid-19?    NO (Yes/No)    4. Have you had any loss of your sense of taste or smell? Have you had N/V or sore throat? NO    Patient has been informed of visitor policy and asked to wear a mask upon entering the hospital   YES (Yes/No)      
Unable to offer due to clinical condition

## 2021-02-24 NOTE — ANESTHESIA PREPROCEDURE EVALUATION
Relevant Problems   CARDIAC   (+) HTN (hypertension)       Physical Exam    Airway   Mallampati: II  TM distance: >3 FB  Neck ROM: full       Cardiovascular - normal exam  Rhythm: regular  Rate: normal  (-) murmur     Dental - normal exam           Pulmonary - normal exam  Breath sounds clear to auscultation     Abdominal    Neurological - normal exam                 Anesthesia Plan    ASA 3       Plan - general and peripheral nerve block     Peripheral nerve block will be post-op pain control  Airway plan will be ETT          Induction: intravenous    Postoperative Plan: Postoperative administration of opioids is intended.    Pertinent diagnostic labs and testing reviewed    Informed Consent:    Anesthetic plan and risks discussed with patient.    Use of blood products discussed with: patient whom consented to blood products.

## 2021-02-24 NOTE — ANESTHESIA TIME REPORT
Anesthesia Start and Stop Event Times     Date Time Event    2/24/2021 1005 Ready for Procedure     1008 Anesthesia Start     1127 Anesthesia Stop        Responsible Staff  02/24/21    Name Role Begin End    Marcos Crews D.O. Anesth 1008 1127        Preop Diagnosis (Free Text):  Pre-op Diagnosis     PAIN IN RIGHT SHOULDER        Preop Diagnosis (Codes):    Post op Diagnosis  Shoulder pain, right      Premium Reason  Non-Premium    Comments:

## 2021-02-24 NOTE — OP REPORT
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  Right reverse total shoulder arthroplasty.  2.  Right open biceps tenodesis.       ANESTHESIOLOGIST: Dr. Mars MD     ANESTHESIA:  General with upper extremity nerve block for pain control.     COMPLICATIONS:  None noted.     DRAINS:  Hemovac x1.     SPECIMENS:  None.     ESTIMATED BLOOD LOSS:  100 mL    IMPLANTS: Integra Titan Reverse Total shoulder arthroplasty system; 10mm pres-fit stem; 3mm polyethylene; 38mm x 5mm lateralized glenosphere with a 13mm base plate with multiple locking and non-locking screws.      INDICATIONS:      This patient is well known to me through my   outpatient clinic for the above-mentioned diagnosis. The patient believes and I would agree they have failed conservative treatment and are a candidate for the   above-mentioned procedure.  Prior to the procedure, the patient understood the risks,   benefits, and alternatives to surgery.  The patient understood the risks to include,   but not limited to the risk of infection requiring repeat surgery, bleeding   requiring blood transfusion, nerve, blood vessel, tendon injury requiring   repair, chronic pain, periprosthetic fracture subsidence, loosening or   dislocation requiring revision surgery, DVT, pulmonary embolism, heart attack,   stroke, and even death.  The patient states despite these risks, the patient wished   to proceed with surgery.     DESCRIPTION OF PROCEDURE:  The patient was met in the preoperative holding   area.  The right shoulder was initialed as correct operative site.  The patient had an   opportunity to ask questions, all questions were answered and informed consent was obtained.     The patient was brought to the operating room and laid  supine on the operating   room table.  All bony and dependent prominences were well-padded.  Upper   extremity nerve block and general anesthesia were induced without any   complication.  Ancef and clindamycin were administered for infection   prophylaxis.  The patient was placed in well-padded beach chair position.  The right   upper extremity was prepped and draped in the usual sterile fashion.  A formal   time-out was performed, in which all parties agreed upon the correct patient,   procedure, and operative site.  I began the procedure by making an oblique   incision in the anterior aspect of the shoulder for a deltopectoral approach.    I identified the cephalic vein, retracted it medially, and cauterized lateral   branches.  I released the upper third of pectoralis major tendon to help   mobilize the shoulder.  Deep to this, I identified the long head of biceps   Tendon, which was very tendonopathic.  Under resting tension, I performed open biceps tenodesis to the upper   half of the pectoralis major tendon with 0 Ethibond suture to prevent painful   mica deformity and subluxation into the implant.  I then excised the   proximal aspect of the tendon including the labral insertion.  I then   performed a longitudinal subscapularis tenotomy while palpating and protecting   the axillary nerve.  I tagged it for later repair, dislocated the shoulder and noted significant arthritis. Osteophytes were removed with a rongeur. I then performed an anatomic neck cut at 30 degrees retroversion.  supraspinatus was torn. I then placed glenoid retractors, excised the labrum and released the capsule circumferentially around the glenoid while palpating and protecting the axillary nerve.  I placed the guide and guidewire adjusting to her anatomy. I then drilled for the central peg.  Using the Titan system, I placed 13 mm baseplate with multiple locking and nonlocking screws and a 38 mm x 5 mm lateralized glenosphere, which had    excellent fixation of the scapula.  I turned my attention back to the humerus,   broached up to the above mentioned stem size, which had excellent press fit and stability.  I reamed the metaphysis and placed the implant, standard body,  stem press fit. I trialed  Polyethylene sized up to the above mentioned size, which had excellent range of motion and stability.  I placed this final component after copious irrigation of the   joint.  I then repaired subscapularis tenotomy with #5 Ti-Cron suture, took   the shoulder through range of motion.  There was excellent range of motion and   stability.  I then placed one deep Hemovac drain, closed the skin with 2-0   Monocryl suture and skin stapler, all covered with sterile Xeroform, 4x4s,   Ioban, and a sling.       The patient was transferred in stable condition to PACU where there were no   immediate post-term complaints.     POSTOPERATIVE PLAN:  The patient will be admitted overnight for pain control   and will be discharged home, likely on postoperative day #1.

## 2021-02-24 NOTE — ANESTHESIA POSTPROCEDURE EVALUATION
Patient: Amada Mcduffie    Procedure Summary     Date: 02/24/21 Room / Location: [facility] ROOM 21 / SURGERY SAME DAY [facility]    Anesthesia Start: 1008 Anesthesia Stop: 1127    Procedure: ARTHROPLASTY, SHOULDER, TOTAL - REVERSE. (Right Shoulder) Diagnosis: (PAIN IN RIGHT SHOULDER)    Surgeons: Rober Lunsford M.D. Responsible Provider: Marcos Crews D.O.    Anesthesia Type: general, peripheral nerve block ASA Status: 3          Final Anesthesia Type: general, peripheral nerve block  Last vitals  BP   Blood Pressure : (!) 181/87    Temp   36.4 °C (97.6 °F)    Pulse   (!) 57   Resp   16    SpO2   95 %      Anesthesia Post Evaluation    Patient location during evaluation: PACU  Patient participation: complete - patient participated  Level of consciousness: awake and alert  Pain score: 0    Airway patency: patent  Anesthetic complications: no  Cardiovascular status: hemodynamically stable  Respiratory status: acceptable  Hydration status: euvolemic    PONV: none          There were no known complications for this encounter.     Nurse Pain Score: 0 (NPRS)

## 2021-02-24 NOTE — ANESTHESIA PROCEDURE NOTES
Peripheral Block    Date/Time: 2/24/2021 9:55 AM  Performed by: Marcos Crews D.O.  Authorized by: Marcos Crews D.O.     Start Time:  2/24/2021 9:55 AM  End Time:  2/24/2021 10:01 AM  Reason for Block: at surgeon's request and post-op pain management ONLY    patient identified, IV checked, site marked, risks and benefits discussed, surgical consent, monitors and equipment checked, pre-op evaluation and timeout performed    Patient Position:  Supine  Prep: ChloraPrep    Monitoring:  Heart rate, continuous pulse ox and cardiac monitor  Block Region:  Upper Extremity  Upper Extremity - Block Type:  BRACHIAL PLEXUS block, Interscalene approach and other  Other Block Type: Right Superficial Cervical Plexus Block  Laterality:  Right  Procedures: ultrasound guided  Image captured, interpreted and electronically stored.  Local Infiltration:  Lidocaine  Strength:  1 %  Dose:  3 ml  Block Type:  Single-shot  Needle Length:  50mm  Needle Gauge:  22 G  Needle Localization:  Ultrasound guidance  Injection Assessment:  Negative aspiration for heme, no paresthesia on injection, incremental injection and local visualized surrounding nerve on ultrasound   Interscalene block volume 12 mL    Superficial Cervical Plexus block volume 9 mL

## 2021-02-24 NOTE — CARE PLAN
Problem: Safety  Goal: Will remain free from falls  Outcome: PROGRESSING AS EXPECTED  Note: Bed in lowest position, locked, upper side rails, treaded socks in place, bed near nursing station, educated about using the call light     Problem: Pain Management  Goal: Pain level will decrease to patient's comfort goal  Outcome: PROGRESSING AS EXPECTED  Note: Educated about pharmacological and non pharmacological pain methods, check the MAR

## 2021-02-25 VITALS
OXYGEN SATURATION: 93 % | SYSTOLIC BLOOD PRESSURE: 164 MMHG | TEMPERATURE: 97.9 F | HEART RATE: 65 BPM | WEIGHT: 137.57 LBS | BODY MASS INDEX: 23.49 KG/M2 | DIASTOLIC BLOOD PRESSURE: 72 MMHG | HEIGHT: 64 IN | RESPIRATION RATE: 18 BRPM

## 2021-02-25 PROCEDURE — A9270 NON-COVERED ITEM OR SERVICE: HCPCS | Performed by: SURGERY

## 2021-02-25 PROCEDURE — 700102 HCHG RX REV CODE 250 W/ 637 OVERRIDE(OP): Performed by: SURGERY

## 2021-02-25 PROCEDURE — 700111 HCHG RX REV CODE 636 W/ 250 OVERRIDE (IP): Performed by: PHYSICIAN ASSISTANT

## 2021-02-25 PROCEDURE — G0378 HOSPITAL OBSERVATION PER HR: HCPCS

## 2021-02-25 RX ORDER — ONDANSETRON 4 MG/1
4 TABLET, FILM COATED ORAL EVERY 4 HOURS PRN
Qty: 5 TABLET | Refills: 3 | Status: SHIPPED | OUTPATIENT
Start: 2021-02-25 | End: 2021-03-12

## 2021-02-25 RX ORDER — ONDANSETRON 4 MG/1
4 TABLET, ORALLY DISINTEGRATING ORAL EVERY 4 HOURS PRN
Status: DISCONTINUED | OUTPATIENT
Start: 2021-02-25 | End: 2021-02-25 | Stop reason: HOSPADM

## 2021-02-25 RX ADMIN — ACETAMINOPHEN 1000 MG: 500 TABLET ORAL at 01:44

## 2021-02-25 RX ADMIN — CELECOXIB 200 MG: 200 CAPSULE ORAL at 05:50

## 2021-02-25 RX ADMIN — OXYCODONE 5 MG: 5 TABLET ORAL at 05:50

## 2021-02-25 RX ADMIN — DOCUSATE SODIUM 100 MG: 100 CAPSULE, LIQUID FILLED ORAL at 05:50

## 2021-02-25 RX ADMIN — OXYCODONE 5 MG: 5 TABLET ORAL at 01:44

## 2021-02-25 RX ADMIN — ONDANSETRON 4 MG: 4 TABLET, ORALLY DISINTEGRATING ORAL at 10:59

## 2021-02-25 RX ADMIN — OXYCODONE 5 MG: 5 TABLET ORAL at 10:59

## 2021-02-25 ASSESSMENT — PAIN DESCRIPTION - PAIN TYPE
TYPE: ACUTE PAIN
TYPE: ACUTE PAIN
TYPE: SURGICAL PAIN
TYPE: SURGICAL PAIN

## 2021-02-25 NOTE — PROGRESS NOTES
Mobility Progress Note    Surgery patient: Yes  Date of surgery: 02/24/2021   Ambulated 50 ft on day of surgery? (N/A if patient did not undergo surgery today): Yes  Number of times ambulated 50 feet or greater today: 1  Patient has been up to chair, edge of bed or HOB 90 degrees for all meals?: Yes  Goal met? (goal is ambulating at least 50 feet 2 times on day shift, one time on night shift): n/a, ambulated once so far on day of surgery

## 2021-02-25 NOTE — DISCHARGE INSTRUCTIONS
Discharge Instructions    Discharged to home by car with relative. Discharged via wheelchair, hospital escort: Yes.  Special equipment needed: Not Applicable    Be sure to schedule a follow-up appointment with your primary care doctor or any specialists as instructed.     Discharge Plan:   Diet Plan: Discussed  Activity Level: Discussed  Confirmed Follow up Appointment: Patient to Call and Schedule Appointment  Confirmed Symptoms Management: Discussed  Medication Reconciliation Updated: Yes  Influenza Vaccine Indication: Patient Refuses    I understand that a diet low in cholesterol, fat, and sodium is recommended for good health. Unless I have been given specific instructions below for another diet, I accept this instruction as my diet prescription.   Other diet: regular    Special Instructions: Discharge instructions for the Orthopedic Patient    Follow up with Primary Care Physician within 2 weeks of discharge to home, regarding:  Review of medications and diagnostic testing.  Surveillance for medical complications.  Workup and treatment of osteoporosis, if appropriate.     -Is this a Hip/Knee/Shoulder Joint Replacement patient? Yes   SHOULDER REPLACEMENT, AFTER-CARE GUIDELINES     These instructions provide you with information on caring for yourself and your shoulder after surgery. Your health care provider may also give you instructions that are more specific. Your treatment has been planned and performed according to current medical practices but problems sometimes occur. Call your health care provider if you have any problems or questions.     WHAT TO EXPECT AFTER THE PROCEDURE   After your procedure, your shoulder and arm will typically be stiff, sore, and bruised. This will improve over time.     HOME CARE INSTRUCTIONS   · Follow your home pain management plan as discussed with your nurse and as directed by your provider.   · It is important to follow any scheduled pain medications as directed for maximal  pain relief.   · If prescribed opioid medication, the goal is to use opioids ONLY IF NEEDED and to wean off prescription pain medicine as soon as possible.   · Apply ice to the injured area for several days after surgery:   · Put ice in a plastic bag.   · Place a towel between your skin and the bag.   · Leave the ice on for 20 minutes, 2-3 times a day at a minimum.   · You may resume your normal diet and activities as directed by your provider.   · Your arm will be in a sling. You will need to wear this for 4-6 weeks after surgery.   · It may be more comfortable to sleep in a recliner for several days or weeks after surgery.   · Do not use your arm to push yourself up in bed or from a chair.   · If prescribed a home exercise plan, follow the program of home exercises as suggested by your provider and/or occupational therapist. Do the exercises at least 3x daily as directed.   · Do not lift anything heavier than a cup of coffee for the first 6 weeks after surgery.   · Ask for help. If you do not have adequate assistance at home, please seek advice from your provider about home care or other services.   · Change dressings only if necessary and as directed. Keep wound dry and covered until otherwise directed by your provider.   · Make sure that you have a follow-up appointment with your provider after surgery.     SEEK URGENT MEDICAL CARE IF:   · You have redness, swelling, or increased pain at your operative site.   · You see pus coming from the wound.   · You have a fever greater than 100.5° F.   · You notice a bad smell coming from the wound or dressing.   · The edges of the wound break open after suture or staple removal.   · You have increasing pain with movement of the shoulder.   · You develop a rash.   · You have chest pain or shortness of breath.     This information is not intended to replace advice given to you by your health care provider. Please discuss any specific questions you have with your health care  provider.       -Is this patient being discharged with medication to prevent blood clots?  No    · Is patient discharged on Warfarin / Coumadin?   No     Depression / Suicide Risk    As you are discharged from this [facility] facility, it is important to learn how to keep safe from harming yourself.    Recognize the warning signs:  · Abrupt changes in personality, positive or negative- including increase in energy   · Giving away possessions  · Change in eating patterns- significant weight changes-  positive or negative  · Change in sleeping patterns- unable to sleep or sleeping all the time   · Unwillingness or inability to communicate  · Depression  · Unusual sadness, discouragement and loneliness  · Talk of wanting to die  · Neglect of personal appearance   · Rebelliousness- reckless behavior  · Withdrawal from people/activities they love  · Confusion- inability to concentrate     If you or a loved one observes any of these behaviors or has concerns about self-harm, here's what you can do:  · Talk about it- your feelings and reasons for harming yourself  · Remove any means that you might use to hurt yourself (examples: pills, rope, extension cords, firearm)  · Get professional help from the community (Mental Health, Substance Abuse, psychological counseling)  · Do not be alone:Call your Safe Contact- someone whom you trust who will be there for you.  · Call your local CRISIS HOTLINE 132-9201 or 388-270-9890  · Call your local Children's Mobile Crisis Response Team [place] (866) 471-9059 or www.IntroMaps  · Call the toll free National Suicide Prevention Hotlines   · National Suicide Prevention Lifeline 928-406-DKYZ (6809)  · National Hope Line Network 800-SUICIDE (851-3192)      ACTIVITY: Rest and take it easy for the first 24 hours.  A responsible adult is recommended to remain with you during that time.  It is normal to feel sleepy.  We encourage you to not do anything that requires balance,  judgment or coordination.    MILD FLU-LIKE SYMPTOMS ARE NORMAL. YOU MAY EXPERIENCE GENERALIZED MUSCLE ACHES, THROAT IRRITATION, HEADACHE AND/OR SOME NAUSEA.    FOR 24 HOURS DO NOT:  Drive, operate machinery or run household appliances.  Drink beer or alcoholic beverages.   Make important decisions or sign legal documents.    SPECIAL INSTRUCTIONS:     Keep Sling on at all times until clinic followup except for gentle elbow motion and hygeine.   Sedentary use only of hand, no shoulder motion until then.     No driving while on narcotic pain medications. Contact auto-insurance carrier for clearance to begin driving again; wait at least 6 weeks.     DIET: To avoid nausea, slowly advance diet as tolerated, avoiding spicy or greasy foods for the first day.  Add more substantial food to your diet according to your physician's instructions.  Babies can be fed formula or breast milk as soon as they are hungry.  INCREASE FLUIDS AND FIBER TO AVOID CONSTIPATION.    SURGICAL DRESSING/BATHING:  Okay to remove bandage in 5 days (Monday) and cover incision with bandaids and get wet. Dressing must stay clean and dry until them.    FOLLOW-UP APPOINTMENT:  A follow-up appointment should be arranged with your doctor; call to schedule.    You should CALL YOUR PHYSICIAN if you develop:  Fever greater than 101 degrees F.  Pain not relieved by medication, or persistent nausea or vomiting.  Excessive bleeding (blood soaking through dressing) or unexpected drainage from the wound.  Extreme redness or swelling around the incision site, drainage of pus or foul smelling drainage.  Inability to urinate or empty your bladder within 8 hours.  Problems with breathing or chest pain.    You should call 911 if you develop problems with breathing or chest pain.  If you are unable to contact your doctor or surgical center, you should go to the nearest emergency room or urgent care center.  Physician's telephone #:   Dr. Lunsford [phone]    If  any questions arise, call your doctor.  If your doctor is not available, please feel free to call the Surgical Center at [phone]. The Contact Center is open Monday through Friday 7AM to 5PM and may speak to a nurse at [phone], or toll free at [phone].     A registered nurse may call you a few days after your surgery to see how you are doing after your procedure.    MEDICATIONS: Resume taking daily medication.  Take prescribed pain medication with food.  If no medication is prescribed, you may take non-aspirin pain medication if needed.  PAIN MEDICATION CAN BE VERY CONSTIPATING.  Take a stool softener or laxative such as senokot, pericolace, or milk of magnesia if needed.        If your physician has prescribed pain medication that includes Acetaminophen (Tylenol), do not take additional Acetaminophen (Tylenol) while taking the prescribed medication.    Depression / Suicide Risk    As you are discharged from this [facility] facility, it is important to learn how to keep safe from harming yourself.    Recognize the warning signs:  · Abrupt changes in personality, positive or negative- including increase in energy   · Giving away possessions  · Change in eating patterns- significant weight changes-  positive or negative  · Change in sleeping patterns- unable to sleep or sleeping all the time   · Unwillingness or inability to communicate  · Depression  · Unusual sadness, discouragement and loneliness  · Talk of wanting to die  · Neglect of personal appearance   · Rebelliousness- reckless behavior  · Withdrawal from people/activities they love  · Confusion- inability to concentrate     If you or a loved one observes any of these behaviors or has concerns about self-harm, here's what you can do:  · Talk about it- your feelings and reasons for harming yourself  · Remove any means that you might use to hurt yourself (examples: pills, rope, extension cords, firearm)  · Get professional help from the  community (Mental Health, Substance Abuse, psychological counseling)  · Do not be alone:Call your Safe Contact- someone whom you trust who will be there for you.  · Call your local CRISIS HOTLINE 038-6358 or 571-052-8256  · Call your local Children's Mobile Crisis Response Team [place] (471) 996-5381 or www.Portero  · Call the toll free National Suicide Prevention Hotlines   · National Suicide Prevention Lifeline 388-492-ELNC (6181)  · National Hope Line Network 800-SUICIDE (806-3060)      Shoulder Joint Replacement, Care After  This sheet gives you information about how to care for yourself after your procedure. Your health care provider may also give you more specific instructions. If you have problems or questions, contact your health care provider.  What can I expect after the procedure?  After your procedure, it is common to have:  · A bruised and stiff shoulder.  · A bruised and stiff arm.  · Some pain.  Follow these instructions at home:  If you have a sling:    · Wear the sling as told by your health care provider. Remove it only as told by your health care provider.  · Loosen the sling if your fingers tingle, become numb, or turn cold and blue.  · Keep the sling clean.  · If the sling is not waterproof:  ? Do not let it get wet.  ? Cover it with a watertight covering when you take a bath or a shower.  Bathing  · Do not take baths, swim, or use a hot tub until your health care provider approves. Ask your health care provider if you can take showers. You may only be allowed to take sponge baths for bathing.  · If your sling is not waterproof, cover it with a watertight covering when you take a bath or a shower.  · Keep the bandage (dressing) dry until your health care provider says it can be removed.  Managing pain, stiffness, and swelling  · If directed, put ice on the affected area.  ? If you have a removable sling, remove it as told by your health care provider.  ? Put ice in a plastic  bag.  ? Place a towel between your skin and the bag.  ? Leave the ice on for 20 minutes, 2-3 times a day.  · Move your fingers often to avoid stiffness and to lessen swelling.  Driving  · Do not drive or use heavy machinery while taking prescription pain medicine.  · Do not drive for 2-4 weeks after surgery or as told by your health care provider.  Medicine  · Take over-the-counter and prescription medicines only as told by your health care provider.  · If you were prescribed an antibiotic medicine, use it as told by your health care provider. Do not stop using the antibiotic even if you start to feel better.  Incision care  · Follow instructions from your health care provider about how to take care of your incision area. Make sure you:  ? Wash your hands with soap and water before you change your bandage (dressing). If soap and water are not available, use hand .  ? Change your dressing as told by your health care provider.  ? Leave staples, stitches (sutures), skin glue, or adhesive strips in place. These skin closures may need to stay in place for 2 weeks or longer. If adhesive strip edges start to loosen and curl up, you may trim the loose edges. Do not remove adhesive strips completely unless your health care provider tells you to do that.  · If you have a tube to remove drainage, follow instructions from your health care provider about caring for it. Do not remove the drain tube or any dressings around the tube opening unless your health care provider approves.  · Check your incision area every day for signs of infection. Check for:  ? More redness, swelling, or pain.  ? More fluid or blood.  ? Warmth.  ? Pus or a bad smell.  Activity  · Do not use your arm to push yourself up in bed or from a chair. This requires too much muscle.  · Follow lifting restrictions as told:  ? Do not lift anything that is heavier than a cup of coffee for the first 6 weeks after surgery, or as told by your health care  provider.  ? Do not lift anything that is heavier than 10 lb (4.5 kg) for 6 months, or as told by your health care provider.  · Do exercises, including physical therapy, as told by your health care provider.  · Try not to overuse your shoulder. This includes repetitive pushing or pulling. Early overuse of the shoulder may result in later problems. (Overusing the shoulder is easy to do when your pain goes away for the first time.)  · Avoid overstretching your arm for 6 weeks after surgery, or as told by your health care provider.  · Avoid sitting for a long time without moving. Get up and move around one or more times every few hours.  · Ask for help with some activities. Your health care provider may be able to suggest a clinic or agency for this if you do not have home support.  · Do not participate in contact sports.  General instructions  · Keep all follow-up visits as told by your health care provider. This is important.  · Do not use any products that contain nicotine or tobacco, such as cigarettes and e-cigarettes. These can delay healing. If you need help quitting, ask your health care provider.  Contact a health care provider if:  · You develop a rash.  · You have a fever.  · You have more redness, swelling, or pain in the incision area.  · You have more fluid or blood coming from your incision area.  · You have more pain when moving your shoulder.  Get help right away if:  · Your incision area feels warm to the touch.  · You have pus or a bad smell coming from your incision area.  · The edges of the incision site break open after sutures have been removed.  · You have chest pain or shortness of breath.  Summary  · It is common to have pain and stiffness in your shoulder and arm after the procedure. Put ice on the affected area and take pain medicine as told by your health care provider.  · Do not use your arm to push yourself up in bed or from a chair.  · Do exercises, including physical therapy, as told by  your health care provider.  · Check your incision area daily. Call your health care provider if you see signs of infection.  This information is not intended to replace advice given to you by your health care provider. Make sure you discuss any questions you have with your health care provider.  Document Released: 07/07/2006 Document Revised: 04/10/2020 Document Reviewed: 10/02/2017  [employer] Patient Education © 2020 [employer]  Ondansetron tablets  What is this medicine?  ONDANSETRON (on DAN se daly) is used to treat nausea and vomiting caused by chemotherapy. It is also used to prevent or treat nausea and vomiting after surgery.  This medicine may be used for other purposes; ask your health care provider or pharmacist if you have questions.  COMMON BRAND NAME(S): Zofran  What should I tell my health care provider before I take this medicine?  They need to know if you have any of these conditions:  · heart disease  · history of irregular heartbeat  · liver disease  · low levels of magnesium or potassium in the blood  · an unusual or allergic reaction to ondansetron, granisetron, other medicines, foods, dyes, or preservatives  · pregnant or trying to get pregnant  · breast-feeding  How should I use this medicine?  Take this medicine by mouth with a glass of water. Follow the directions on your prescription label. Take your doses at regular intervals. Do not take your medicine more often than directed.  Talk to your pediatrician regarding the use of this medicine in children. Special care may be needed.  Overdosage: If you think you have taken too much of this medicine contact a poison control center or emergency room at once.  NOTE: This medicine is only for you. Do not share this medicine with others.  What if I miss a dose?  If you miss a dose, take it as soon as you can. If it is almost time for your next dose, take only that dose. Do not take double or extra doses.  What may interact with this medicine?  Do  not take this medicine with any of the following medications:  · apomorphine  · certain medicines for fungal infections like fluconazole, itraconazole, ketoconazole, posaconazole, voriconazole  · cisapride  · dronedarone  · pimozide  · thioridazine  This medicine may also interact with the following medications:  · carbamazepine  · certain medicines for depression, anxiety, or psychotic disturbances  · fentanyl  · linezolid  · MAOIs like Carbex, Eldepryl, Marplan, Nardil, and Parnate  · methylene blue (injected into a vein)  · other medicines that prolong the QT interval (cause an abnormal heart rhythm) like dofetilide, ziprasidone  · phenytoin  · rifampicin  · tramadol  This list may not describe all possible interactions. Give your health care provider a list of all the medicines, herbs, non-prescription drugs, or dietary supplements you use. Also tell them if you smoke, drink alcohol, or use illegal drugs. Some items may interact with your medicine.  What should I watch for while using this medicine?  Check with your doctor or health care professional right away if you have any sign of an allergic reaction.  What side effects may I notice from receiving this medicine?  Side effects that you should report to your doctor or health care professional as soon as possible:  · allergic reactions like skin rash, itching or hives, swelling of the face, lips or tongue  · breathing problems  · confusion  · dizziness  · fast or irregular heartbeat  · feeling faint or lightheaded, falls  · fever and chills  · loss of balance or coordination  · seizures  · sweating  · swelling of the hands or feet  · tightness in the chest  · tremors  · unusually weak or tired  Side effects that usually do not require medical attention (report to your doctor or health care professional if they continue or are bothersome):  · constipation or diarrhea  · headache  This list may not describe all possible side effects. Call your doctor for medical  advice about side effects. You may report side effects to FDA at 5-944-FFK-6358.  Where should I keep my medicine?  Keep out of the reach of children.  Store between 2 and 30 degrees C (36 and 86 degrees F). Throw away any unused medicine after the expiration date.  NOTE: This sheet is a summary. It may not cover all possible information. If you have questions about this medicine, talk to your doctor, pharmacist, or health care provider.  © 2020 [employer] Standard (2019-12-10 07:16:43)

## 2021-02-25 NOTE — DISCHARGE SUMMARY
DISCHARGE SUMMARY    PATIENTS NAME: Amada Mcduffie    MRN: [id number]    CSN: [id number]    ADMIT DATE:  2/24/2021    DISCHARGE DATE: 2/25/2021    ADMIT MD: Rober Lunsford M.D.    DISCHARGE MD: Rober Lunsford M.D.    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  Right reverse total shoulder arthroplasty with open biceps tenodesis.    CONSULTATIONS:      HOSPITAL COURSE: Patient is a 80 year old female with long standing right shoulder pain.  She has been followed by Dr Rober Lunsford MD of the [facility].  She has to date failed all conservative management.  For this reason Dr Rober Lunsford M.D. felt that surgical intervention was neccisitated.   After explaining the indications, risks, benefits, and alternatives the patient wished to proceed with surgical intervention.  The patient was taken to the OR for the above mentioned procedure.  She had no complications and minimal blood loss. She has done well with mobilization and her pain has been well controlled with oral medications. She is now ready for DC at this time.     DISCHARGE LOCATION: home     DVT PROPHYLAXSIS:ambulatory    ANTIBIOTICS:perioperative completed    WEIGHT BEARING:non weight bearing right upper extremity    FOLLOW UP: 10-14 days post operatively with Rober Lunsford M.D.    DISCHARGE DIAGNOSIS: Status post right reverse total shoulder arthroplasty with open biceps tenodesis.    MEDICATIONS:   Current Outpatient Medications   Medication Sig Dispense Refill   • ondansetron (ZOFRAN) 4 MG Tab tablet Take 1 tablet by mouth every four hours as needed for Nausea/Vomiting for up to 15 days. 5 tablet 3

## 2021-02-25 NOTE — CARE PLAN
Problem: Infection  Goal: Will remain free from infection  Outcome: PROGRESSING AS EXPECTED  Note: Afebrile, educated about handwashing, surgical site dressing is CD & I     Problem: Pain Management  Goal: Pain level will decrease to patient's comfort goal  Outcome: PROGRESSING AS EXPECTED  Note: Educated about the pain scale, pharmacological and non pharmacological pain methods

## 2021-02-25 NOTE — PROGRESS NOTES
2 RN skin check completed with SULAIMAN Hartley  Devices in place SCD to BLE, one HVAC drain.  Skin assessed under devices :Yes.  Confirmed pressure ulcers found on :n/a.  New potential pressure ulcers noted on n/a.   Surgical site dressing to the right shoulder is CD & I  Using moisturizer and repositioning by self in bed

## 2021-02-25 NOTE — DISCHARGE PLANNING
Patient rolled back to observation/outpatient status per attending MD determination Dr. Lunsford and UR committee MD   secondary review Dr. Dexter. Condition code 44 completed

## 2021-02-25 NOTE — PROGRESS NOTES
Pt. Is given discharge information, educated about medications, educated about following up with upcoming appointment, IV d/c  Daughter and pt. Have no further questions  Called Ortho PA before D/C to get oral zofran because of nausea. Okay to order oral Zofran and medrec will be done per Ortho PA Griffin Dalton

## 2021-02-25 NOTE — PROGRESS NOTES
"[facility] REVIEW NOTE    I was asked by [facility] case management to review this case to see if patient meets hospitalization criteria, inpatient status.      I have personally and independently completed this review.  Patient admitted electively for right reverse total shoulder arthroplasty which was performed on February 24, 2021.  Postoperatively patient admitted under inpatient status with no anticipation for length of stay to be more than 2 midnights as documented in operative note \" anticipation for discharge on postoperative day #1\".  In addition no medical interventions performed postoperatively consistent with care requiring hospitalization on an inpatient basis.    Recommendations: Patient does not meet acute inpatient status criteria, consider transition to observation status/code 44    Cathi Dexter M.D.  02/25/21  10:11 AM      "

## 2021-02-25 NOTE — CARE PLAN
Problem: Communication  Goal: The ability to communicate needs accurately and effectively will improve  Outcome: PROGRESSING AS EXPECTED     Problem: Safety  Goal: Will remain free from injury  Outcome: PROGRESSING AS EXPECTED     Problem: Infection  Goal: Will remain free from infection  Outcome: PROGRESSING AS EXPECTED     Problem: Pain Management  Goal: Pain level will decrease to patient's comfort goal  Outcome: PROGRESSING AS EXPECTED       VSS. Pain managed per mar. Pt to have hemovac dc today by Ortho PA. Pt to d/c home today.

## 2021-02-27 ENCOUNTER — HOSPITAL ENCOUNTER (OUTPATIENT)
Facility: MEDICAL CENTER | Age: 81
DRG: 682 | End: 2021-02-28
Attending: EMERGENCY MEDICINE | Admitting: STUDENT IN AN ORGANIZED HEALTH CARE EDUCATION/TRAINING PROGRAM
Payer: MEDICARE

## 2021-02-27 ENCOUNTER — APPOINTMENT (OUTPATIENT)
Dept: RADIOLOGY | Facility: MEDICAL CENTER | Age: 81
DRG: 682 | End: 2021-02-27
Attending: EMERGENCY MEDICINE
Payer: MEDICARE

## 2021-02-27 ENCOUNTER — NURSE TRIAGE (OUTPATIENT)
Dept: HEALTH INFORMATION MANAGEMENT | Facility: OTHER | Age: 81
End: 2021-02-27

## 2021-02-27 DIAGNOSIS — D64.9 ANEMIA, UNSPECIFIED TYPE: ICD-10-CM

## 2021-02-27 DIAGNOSIS — R79.89 ELEVATED TROPONIN: ICD-10-CM

## 2021-02-27 DIAGNOSIS — I95.9 HYPOTENSION, UNSPECIFIED HYPOTENSION TYPE: ICD-10-CM

## 2021-02-27 DIAGNOSIS — R79.89 ELEVATED SERUM CREATININE: ICD-10-CM

## 2021-02-27 PROBLEM — N17.9 AKI (ACUTE KIDNEY INJURY) (HCC): Status: ACTIVE | Noted: 2021-02-27

## 2021-02-27 PROBLEM — I44.7 LBBB (LEFT BUNDLE BRANCH BLOCK): Status: ACTIVE | Noted: 2021-02-27

## 2021-02-27 LAB
ALBUMIN SERPL BCP-MCNC: 3.7 G/DL (ref 3.2–4.9)
ALBUMIN/GLOB SERPL: 1.4 G/DL
ALP SERPL-CCNC: 53 U/L (ref 30–99)
ALT SERPL-CCNC: 10 U/L (ref 2–50)
ANION GAP SERPL CALC-SCNC: 11 MMOL/L (ref 7–16)
AST SERPL-CCNC: 27 U/L (ref 12–45)
BASOPHILS # BLD AUTO: 0.3 % (ref 0–1.8)
BASOPHILS # BLD: 0.02 K/UL (ref 0–0.12)
BILIRUB SERPL-MCNC: 0.4 MG/DL (ref 0.1–1.5)
BUN SERPL-MCNC: 21 MG/DL (ref 8–22)
CALCIUM SERPL-MCNC: 8.6 MG/DL (ref 8.4–10.2)
CHLORIDE SERPL-SCNC: 90 MMOL/L (ref 96–112)
CO2 SERPL-SCNC: 28 MMOL/L (ref 20–33)
CREAT SERPL-MCNC: 1.91 MG/DL (ref 0.5–1.4)
EKG IMPRESSION: NORMAL
EOSINOPHIL # BLD AUTO: 0.1 K/UL (ref 0–0.51)
EOSINOPHIL NFR BLD: 1.4 % (ref 0–6.9)
ERYTHROCYTE [DISTWIDTH] IN BLOOD BY AUTOMATED COUNT: 48.5 FL (ref 35.9–50)
GLOBULIN SER CALC-MCNC: 2.7 G/DL (ref 1.9–3.5)
GLUCOSE SERPL-MCNC: 149 MG/DL (ref 65–99)
HCT VFR BLD AUTO: 26.9 % (ref 37–47)
HGB BLD-MCNC: 9.1 G/DL (ref 12–16)
IMM GRANULOCYTES # BLD AUTO: 0.03 K/UL (ref 0–0.11)
IMM GRANULOCYTES NFR BLD AUTO: 0.4 % (ref 0–0.9)
LACTATE BLD-SCNC: 1.3 MMOL/L (ref 0.5–2)
LYMPHOCYTES # BLD AUTO: 2.29 K/UL (ref 1–4.8)
LYMPHOCYTES NFR BLD: 31.8 % (ref 22–41)
MCH RBC QN AUTO: 31.2 PG (ref 27–33)
MCHC RBC AUTO-ENTMCNC: 33.8 G/DL (ref 33.6–35)
MCV RBC AUTO: 92.1 FL (ref 81.4–97.8)
MONOCYTES # BLD AUTO: 0.96 K/UL (ref 0–0.85)
MONOCYTES NFR BLD AUTO: 13.4 % (ref 0–13.4)
NEUTROPHILS # BLD AUTO: 3.79 K/UL (ref 2–7.15)
NEUTROPHILS NFR BLD: 52.7 % (ref 44–72)
NRBC # BLD AUTO: 0 K/UL
NRBC BLD-RTO: 0 /100 WBC
PLATELET # BLD AUTO: 214 K/UL (ref 164–446)
PMV BLD AUTO: 10.2 FL (ref 9–12.9)
POTASSIUM SERPL-SCNC: 3.8 MMOL/L (ref 3.6–5.5)
PROT SERPL-MCNC: 6.4 G/DL (ref 6–8.2)
RBC # BLD AUTO: 2.92 M/UL (ref 4.2–5.4)
SARS-COV-2 RNA RESP QL NAA+PROBE: NOTDETECTED
SODIUM SERPL-SCNC: 129 MMOL/L (ref 135–145)
SPECIMEN SOURCE: NORMAL
TROPONIN T SERPL-MCNC: 45 NG/L (ref 6–19)
WBC # BLD AUTO: 7.2 K/UL (ref 4.8–10.8)

## 2021-02-27 PROCEDURE — A9270 NON-COVERED ITEM OR SERVICE: HCPCS | Performed by: EMERGENCY MEDICINE

## 2021-02-27 PROCEDURE — 96372 THER/PROPH/DIAG INJ SC/IM: CPT

## 2021-02-27 PROCEDURE — 99219 PR INITIAL OBSERVATION CARE,LEVL II: CPT | Performed by: STUDENT IN AN ORGANIZED HEALTH CARE EDUCATION/TRAINING PROGRAM

## 2021-02-27 PROCEDURE — 93005 ELECTROCARDIOGRAM TRACING: CPT | Performed by: EMERGENCY MEDICINE

## 2021-02-27 PROCEDURE — 36415 COLL VENOUS BLD VENIPUNCTURE: CPT

## 2021-02-27 PROCEDURE — 71045 X-RAY EXAM CHEST 1 VIEW: CPT

## 2021-02-27 PROCEDURE — 84484 ASSAY OF TROPONIN QUANT: CPT

## 2021-02-27 PROCEDURE — 700102 HCHG RX REV CODE 250 W/ 637 OVERRIDE(OP): Performed by: EMERGENCY MEDICINE

## 2021-02-27 PROCEDURE — 700102 HCHG RX REV CODE 250 W/ 637 OVERRIDE(OP): Performed by: STUDENT IN AN ORGANIZED HEALTH CARE EDUCATION/TRAINING PROGRAM

## 2021-02-27 PROCEDURE — 83605 ASSAY OF LACTIC ACID: CPT

## 2021-02-27 PROCEDURE — 99285 EMERGENCY DEPT VISIT HI MDM: CPT

## 2021-02-27 PROCEDURE — 700111 HCHG RX REV CODE 636 W/ 250 OVERRIDE (IP): Performed by: STUDENT IN AN ORGANIZED HEALTH CARE EDUCATION/TRAINING PROGRAM

## 2021-02-27 PROCEDURE — U0003 INFECTIOUS AGENT DETECTION BY NUCLEIC ACID (DNA OR RNA); SEVERE ACUTE RESPIRATORY SYNDROME CORONAVIRUS 2 (SARS-COV-2) (CORONAVIRUS DISEASE [COVID-19]), AMPLIFIED PROBE TECHNIQUE, MAKING USE OF HIGH THROUGHPUT TECHNOLOGIES AS DESCRIBED BY CMS-2020-01-R: HCPCS

## 2021-02-27 PROCEDURE — A9270 NON-COVERED ITEM OR SERVICE: HCPCS | Performed by: STUDENT IN AN ORGANIZED HEALTH CARE EDUCATION/TRAINING PROGRAM

## 2021-02-27 PROCEDURE — G0378 HOSPITAL OBSERVATION PER HR: HCPCS

## 2021-02-27 PROCEDURE — 80053 COMPREHEN METABOLIC PANEL: CPT

## 2021-02-27 PROCEDURE — 85025 COMPLETE CBC W/AUTO DIFF WBC: CPT

## 2021-02-27 PROCEDURE — 700105 HCHG RX REV CODE 258: Performed by: EMERGENCY MEDICINE

## 2021-02-27 PROCEDURE — U0005 INFEC AGEN DETEC AMPLI PROBE: HCPCS

## 2021-02-27 RX ORDER — HYDROCODONE BITARTRATE AND ACETAMINOPHEN 5; 325 MG/1; MG/1
1 TABLET ORAL ONCE
Status: COMPLETED | OUTPATIENT
Start: 2021-02-27 | End: 2021-02-27

## 2021-02-27 RX ORDER — OXYCODONE HYDROCHLORIDE AND ACETAMINOPHEN 5; 325 MG/1; MG/1
1 TABLET ORAL EVERY 4 HOURS PRN
Status: DISCONTINUED | OUTPATIENT
Start: 2021-02-27 | End: 2021-02-28 | Stop reason: HOSPADM

## 2021-02-27 RX ORDER — ONDANSETRON 2 MG/ML
4 INJECTION INTRAMUSCULAR; INTRAVENOUS EVERY 4 HOURS PRN
Status: DISCONTINUED | OUTPATIENT
Start: 2021-02-27 | End: 2021-02-28 | Stop reason: HOSPADM

## 2021-02-27 RX ORDER — HYDROCODONE BITARTRATE AND ACETAMINOPHEN 5; 325 MG/1; MG/1
1 TABLET ORAL ONCE
Status: DISCONTINUED | OUTPATIENT
Start: 2021-02-27 | End: 2021-02-27

## 2021-02-27 RX ORDER — AMOXICILLIN 250 MG
2 CAPSULE ORAL 2 TIMES DAILY
Status: DISCONTINUED | OUTPATIENT
Start: 2021-02-27 | End: 2021-02-28 | Stop reason: HOSPADM

## 2021-02-27 RX ORDER — SIMVASTATIN 20 MG
20 TABLET ORAL EVERY EVENING
Status: DISCONTINUED | OUTPATIENT
Start: 2021-02-27 | End: 2021-02-28 | Stop reason: HOSPADM

## 2021-02-27 RX ORDER — BISACODYL 10 MG
10 SUPPOSITORY, RECTAL RECTAL
Status: DISCONTINUED | OUTPATIENT
Start: 2021-02-27 | End: 2021-02-28 | Stop reason: HOSPADM

## 2021-02-27 RX ORDER — CARVEDILOL 12.5 MG/1
12.5 TABLET ORAL 2 TIMES DAILY WITH MEALS
COMMUNITY

## 2021-02-27 RX ORDER — SODIUM CHLORIDE, SODIUM LACTATE, POTASSIUM CHLORIDE, CALCIUM CHLORIDE 600; 310; 30; 20 MG/100ML; MG/100ML; MG/100ML; MG/100ML
INJECTION, SOLUTION INTRAVENOUS CONTINUOUS
Status: DISCONTINUED | OUTPATIENT
Start: 2021-02-27 | End: 2021-02-28

## 2021-02-27 RX ORDER — LABETALOL HYDROCHLORIDE 5 MG/ML
10 INJECTION, SOLUTION INTRAVENOUS EVERY 4 HOURS PRN
Status: DISCONTINUED | OUTPATIENT
Start: 2021-02-27 | End: 2021-02-28 | Stop reason: HOSPADM

## 2021-02-27 RX ORDER — POLYETHYLENE GLYCOL 3350 17 G/17G
1 POWDER, FOR SOLUTION ORAL
Status: DISCONTINUED | OUTPATIENT
Start: 2021-02-27 | End: 2021-02-28 | Stop reason: HOSPADM

## 2021-02-27 RX ORDER — ONDANSETRON 4 MG/1
4 TABLET, ORALLY DISINTEGRATING ORAL EVERY 4 HOURS PRN
Status: DISCONTINUED | OUTPATIENT
Start: 2021-02-27 | End: 2021-02-28 | Stop reason: HOSPADM

## 2021-02-27 RX ORDER — OXYCODONE HYDROCHLORIDE AND ACETAMINOPHEN 5; 325 MG/1; MG/1
1 TABLET ORAL EVERY 4 HOURS PRN
COMMUNITY
End: 2021-11-09

## 2021-02-27 RX ADMIN — OXYCODONE HYDROCHLORIDE AND ACETAMINOPHEN 1 TABLET: 5; 325 TABLET ORAL at 20:10

## 2021-02-27 RX ADMIN — HYDROCODONE BITARTRATE AND ACETAMINOPHEN 1 TABLET: 5; 325 TABLET ORAL at 15:20

## 2021-02-27 RX ADMIN — SENNOSIDES AND DOCUSATE SODIUM 2 TABLET: 8.6; 5 TABLET ORAL at 17:45

## 2021-02-27 RX ADMIN — SODIUM CHLORIDE, POTASSIUM CHLORIDE, SODIUM LACTATE AND CALCIUM CHLORIDE: 600; 310; 30; 20 INJECTION, SOLUTION INTRAVENOUS at 14:08

## 2021-02-27 RX ADMIN — ENOXAPARIN SODIUM 40 MG: 40 INJECTION SUBCUTANEOUS at 17:45

## 2021-02-27 RX ADMIN — SIMVASTATIN 20 MG: 20 TABLET, FILM COATED ORAL at 17:45

## 2021-02-27 ASSESSMENT — COGNITIVE AND FUNCTIONAL STATUS - GENERAL
DAILY ACTIVITIY SCORE: 17
DRESSING REGULAR UPPER BODY CLOTHING: A LOT
STANDING UP FROM CHAIR USING ARMS: A LITTLE
SUGGESTED CMS G CODE MODIFIER MOBILITY: CJ
SUGGESTED CMS G CODE MODIFIER DAILY ACTIVITY: CK
DRESSING REGULAR LOWER BODY CLOTHING: A LOT
TURNING FROM BACK TO SIDE WHILE IN FLAT BAD: A LITTLE
CLIMB 3 TO 5 STEPS WITH RAILING: A LITTLE
WALKING IN HOSPITAL ROOM: A LITTLE
HELP NEEDED FOR BATHING: A LOT
MOBILITY SCORE: 20
PERSONAL GROOMING: A LITTLE

## 2021-02-27 ASSESSMENT — LIFESTYLE VARIABLES
CONSUMPTION TOTAL: NEGATIVE
ALCOHOL_USE: NO
EVER HAD A DRINK FIRST THING IN THE MORNING TO STEADY YOUR NERVES TO GET RID OF A HANGOVER: NO
AVERAGE NUMBER OF DAYS PER WEEK YOU HAVE A DRINK CONTAINING ALCOHOL: 0
ON A TYPICAL DAY WHEN YOU DRINK ALCOHOL HOW MANY DRINKS DO YOU HAVE: 0
HAVE PEOPLE ANNOYED YOU BY CRITICIZING YOUR DRINKING: NO
TOTAL SCORE: 0
TOTAL SCORE: 0
HOW MANY TIMES IN THE PAST YEAR HAVE YOU HAD 5 OR MORE DRINKS IN A DAY: 0
HAVE YOU EVER FELT YOU SHOULD CUT DOWN ON YOUR DRINKING: NO
EVER FELT BAD OR GUILTY ABOUT YOUR DRINKING: NO
TOTAL SCORE: 0

## 2021-02-27 ASSESSMENT — PATIENT HEALTH QUESTIONNAIRE - PHQ9
1. LITTLE INTEREST OR PLEASURE IN DOING THINGS: NOT AT ALL
SUM OF ALL RESPONSES TO PHQ9 QUESTIONS 1 AND 2: 0
2. FEELING DOWN, DEPRESSED, IRRITABLE, OR HOPELESS: NOT AT ALL

## 2021-02-27 ASSESSMENT — ENCOUNTER SYMPTOMS
SHORTNESS OF BREATH: 0
VOMITING: 0
ABDOMINAL PAIN: 0
FOCAL WEAKNESS: 0
FEVER: 0
WEAKNESS: 1
CHILLS: 0
NAUSEA: 1
MYALGIAS: 0
COUGH: 0
PALPITATIONS: 0

## 2021-02-27 ASSESSMENT — FIBROSIS 4 INDEX: FIB4 SCORE: 3.15

## 2021-02-27 NOTE — ASSESSMENT & PLAN NOTE
Cr 1.91.  Baseline creatinine appears to be 0.7-0.9.  Possibly due to poor perfusion secondary to hypotension.  IV fluids given  Repeat BMP in a.m.  Avoid nephrotoxic agents

## 2021-02-27 NOTE — ED PROVIDER NOTES
"ED Provider Note    CHIEF COMPLAINT  Chief Complaint   Patient presents with   • Hypotension     Reports shoulder replacement approx 3 days days ago, daughter states she is checking BP at home because \"she takes carvedilol, lisinopril, lasix, and is on pain medication\". States BP at home was \"70s-90s\". Pt. denies fevers or drainage from surgical site, denies cough or SOB. O2 in triage 84% RA, placed on 2L NC.        HPI  Amada Mcduffie is a 80 y.o. female who presents with low blood pressure.  The patient had shoulder replacement surgery 3 days ago has been doing pretty well she has some soreness in the shoulder and constipation but has had no different pains and has had no shortness of breath.  Her daughter is been taking her blood pressure noticed to be low today.  She said it was in the 80s and 90s she is brought in for evaluation upon which time she was found to be hypoxic at 84% and was placed on O2.  She has no chest pain no neck pain currently no shortness of breath she has some nausea that worsens with moving.  She has no abdominal pain no melena hematochezia.  No leg swelling no syncope.  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 No pertinent family history.    SOCIAL HISTORY  Social History     Socioeconomic History   • Marital status:      Spouse name: Not on file   • Number of children: Not on file   • Years of education: Not on " file   • Highest education level: Not on file   Occupational History   • Not on file   Tobacco Use   • Smoking status: Never Smoker   • Smokeless tobacco: Never Used   Substance and Sexual Activity   • Alcohol use: No   • Drug use: No   • Sexual activity: Not on file   Other Topics Concern   • Not on file   Social History Narrative   • Not on file     Social Determinants of Health     Financial Resource Strain:    • Difficulty of Paying Living Expenses:    Food Insecurity:    • Worried About Running Out of Food in the Last Year:    • Ran Out of Food in the Last Year:    Transportation Needs:    • Lack of Transportation (Medical):    • Lack of Transportation (Non-Medical):    Physical Activity:    • Days of Exercise per Week:    • Minutes of Exercise per Session:    Stress:    • Feeling of Stress :    Social Connections:    • Frequency of Communication with Friends and Family:    • Frequency of Social Gatherings with Friends and Family:    • Attends Sabianism Services:    • Active Member of Clubs or Organizations:    • Attends Club or Organization Meetings:    • Marital Status:    Intimate Partner Violence:    • Fear of Current or Ex-Partner:    • Emotionally Abused:    • Physically Abused:    • Sexually Abused:        SURGICAL HISTORY  Past Surgical History:   Procedure Laterality Date   • PB RECONSTR TOTAL SHOULDER IMPLANT Right 2/24/2021    Procedure: ARTHROPLASTY, SHOULDER, TOTAL - REVERSE.;  Surgeon: Rober Lunsford M.D.;  Location: SURGERY SAME DAY [facility];  Service: Orthopedics   • CRANIOTOMY  1/1/2014    Performed by Melanie Pham M.D. 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, aneurysm of renal artery   • OTHER ORTHOPEDIC SURGERY      bilateral TKA       CURRENT MEDICATIONS  Home Medications    **Home medications have not yet been reviewed for this  "encounter**         ALLERGIES  Allergies   Allergen Reactions   • Diltiazem Rash     Increased BP   • Morphine Vomiting   • Pcn [Penicillins] Rash     Rash throughout the body       PHYSICAL EXAM  VITAL SIGNS: /58   Pulse 69   Temp 36.7 °C (98.1 °F) (Temporal)   Resp 20   Ht 1.626 m (5' 4\")   Wt 62.1 kg (137 lb)   SpO2 99%   BMI 23.52 kg/m²     Constitutional: Patient is alert and oriented x3 in no distress   HENT: Moist mucous membranes  Eyes:   No conjunctivitis  Neck: Trach is midline  Lymphatic: Negative anterior cervical lymphadenopathy  Cardiovascular: Normal heart rate    Thorax & Lungs: Clear to auscultation    Abdomen: Nontender  Neurologic: Normal motor sensation  Extremities: No pretibial edema  Psychiatric: Affect normal, Judgment normal, Mood normal.     Results for orders placed or performed during the hospital encounter of 02/27/21   CBC WITH DIFFERENTIAL   Result Value Ref Range    WBC 7.2 4.8 - 10.8 K/uL    RBC 2.92 (L) 4.20 - 5.40 M/uL    Hemoglobin 9.1 (L) 12.0 - 16.0 g/dL    Hematocrit 26.9 (L) 37.0 - 47.0 %    MCV 92.1 81.4 - 97.8 fL    MCH 31.2 27.0 - 33.0 pg    MCHC 33.8 33.6 - 35.0 g/dL    RDW 48.5 35.9 - 50.0 fL    Platelet Count 214 164 - 446 K/uL    MPV 10.2 9.0 - 12.9 fL    Neutrophils-Polys 52.70 44.00 - 72.00 %    Lymphocytes 31.80 22.00 - 41.00 %    Monocytes 13.40 0.00 - 13.40 %    Eosinophils 1.40 0.00 - 6.90 %    Basophils 0.30 0.00 - 1.80 %    Immature Granulocytes 0.40 0.00 - 0.90 %    Nucleated RBC 0.00 /100 WBC    Neutrophils (Absolute) 3.79 2.00 - 7.15 K/uL    Lymphs (Absolute) 2.29 1.00 - 4.80 K/uL    Monos (Absolute) 0.96 (H) 0.00 - 0.85 K/uL    Eos (Absolute) 0.10 0.00 - 0.51 K/uL    Baso (Absolute) 0.02 0.00 - 0.12 K/uL    Immature Granulocytes (abs) 0.03 0.00 - 0.11 K/uL    NRBC (Absolute) 0.00 K/uL   COMP METABOLIC PANEL   Result Value Ref Range    Sodium 129 (L) 135 - 145 mmol/L    Potassium 3.8 3.6 - 5.5 mmol/L    Chloride 90 (L) 96 - 112 mmol/L    Co2 28 " 20 - 33 mmol/L    Anion Gap 11.0 7.0 - 16.0    Glucose 149 (H) 65 - 99 mg/dL    Bun 21 8 - 22 mg/dL    Creatinine 1.91 (H) 0.50 - 1.40 mg/dL    Calcium 8.6 8.4 - 10.2 mg/dL    AST(SGOT) 27 12 - 45 U/L    ALT(SGPT) 10 2 - 50 U/L    Alkaline Phosphatase 53 30 - 99 U/L    Total Bilirubin 0.4 0.1 - 1.5 mg/dL    Albumin 3.7 3.2 - 4.9 g/dL    Total Protein 6.4 6.0 - 8.2 g/dL    Globulin 2.7 1.9 - 3.5 g/dL    A-G Ratio 1.4 g/dL   TROPONIN   Result Value Ref Range    Troponin T 45 (H) 6 - 19 ng/L   LACTIC ACID   Result Value Ref Range    Lactic Acid 1.3 0.5 - 2.0 mmol/L   ESTIMATED GFR   Result Value Ref Range    GFR If  31 (A) >60 mL/min/1.73 m 2    GFR If Non African American 25 (A) >60 mL/min/1.73 m 2   EKG (NOW)   Result Value Ref Range    Report       [facility] Emergency [facility]    Test Date:  2021  Pt Name:    MISTY RIVERA             Department: [facility]  MRN:        [id number]                      [facility]   Gender:     Female                       Technician: [id number]  :        [id number]                   Requested By:NASIR ATKINS  Order #:    [id number]                    Reading MD: NASIR ATKINS MD    Measurements  Intervals                                Axis  Rate:       67                           P:          74  IL:         173                          QRS:        34  QRSD:       143                          T:          121  QT:         457  QTc:        483    Interpretive Statements  Sinus rhythm  Left bundle branch block  Baseline wander in lead(s) III,aVL  Compared to ECG 02/10/2021 14:35:21  Sinus bradycardia no longer present  Electronically Signed On 2021 12:44:21 PST by NASIR ATKINS MD        DX-CHEST-PORTABLE (1 VIEW)   Final Result      No acute cardiopulmonary process is seen.      Rounded metallic density projects over the right upper quadrant measuring 1 cm. Further evaluation can be performed with dedicated imaging of the  upper abdomen.      Post surgical changes of the right shoulder.               COURSE & MEDICAL DECISION MAKING  Pertinent Labs & Imaging studies reviewed. (See chart for details)  Patient had hypotension at home but here is borderline low for her age.  She is on 3 antihypertensives that could be the cause.  She does have an incidental hypoxia so further evaluation is being obtained with chest x-ray blood work    Patient presents with a blood pressure that was low at home and hypoxia in the ER.  Her EKG shows left bundle branch block which is unchanged from her previous one done 2 weeks ago.  Her troponin is elevated slightly to 45 and her creatinine is elevated from 2 weeks ago to 1.91 and her hemoglobin is low at 9.1.  2 weeks ago was above 13.      Due to the patient's increase in creatinine and troponin and her significant hemoglobin drop and hypoxia she will be admitted for further observation      FINAL IMPRESSION  1.   2.   1. Hypotension, unspecified hypotension type     2. Elevated serum creatinine     3. Anemia, unspecified type     4. Elevated troponin         3.         Electronically signed by: Terrell Elena M.D., 2/27/2021 12:41 PM

## 2021-02-27 NOTE — TELEPHONE ENCOUNTER
"Pt daughter called on behalf of pt.Had shoulder surgery last week. Has hx of htn. Has been taking pain medications as prescribed and has been feeling nauseous, light headed and dizzy. Daughter states bp this am was 77/42. Currently 92/50. D/t pts sx and recent shoulder sx, advised to call 911 to transfer to hospital for further evaluation.       Reason for Disposition  • Systolic BP < 90 and feeling dizzy, lightheaded, or weak    Answer Assessment - Initial Assessment Questions  1. BLOOD PRESSURE: \"What is the blood pressure?\" \"Did you take at least two measurements 5 minutes apart?\"      77/42, 92/55  2. ONSET: \"When did you take your blood pressure?\"      now  3. HOW: \"How did you obtain the blood pressure?\" (e.g., visiting nurse, automatic home BP monitor)      Home BP monitor  4. HISTORY: \"Do you have a history of low blood pressure?\" \"What is your blood pressure normally?\"      unk  5. MEDICATIONS: \"Are you taking any medications for blood pressure?\" If yes: \"Have they been changed recently?\"      Carviedilol  6. PULSE RATE: \"Do you know what your pulse rate is?\"       79  7. OTHER SYMPTOMS: \"Have you been sick recently?\" \"Have you had a recent injury?\"      Nausea, dizzy  8. PREGNANCY: \"Is there any chance you are pregnant?\" \"When was your last menstrual period?\"      no    Protocols used: LOW BLOOD PRESSURE-A-OH      "

## 2021-02-28 VITALS
RESPIRATION RATE: 16 BRPM | OXYGEN SATURATION: 93 % | DIASTOLIC BLOOD PRESSURE: 60 MMHG | BODY MASS INDEX: 24.2 KG/M2 | SYSTOLIC BLOOD PRESSURE: 149 MMHG | TEMPERATURE: 98.6 F | HEIGHT: 64 IN | WEIGHT: 141.76 LBS | HEART RATE: 64 BPM

## 2021-02-28 LAB
ANION GAP SERPL CALC-SCNC: 9 MMOL/L (ref 7–16)
BASOPHILS # BLD AUTO: 0.3 % (ref 0–1.8)
BASOPHILS # BLD: 0.02 K/UL (ref 0–0.12)
BUN SERPL-MCNC: 19 MG/DL (ref 8–22)
CALCIUM SERPL-MCNC: 8.3 MG/DL (ref 8.4–10.2)
CHLORIDE SERPL-SCNC: 93 MMOL/L (ref 96–112)
CO2 SERPL-SCNC: 28 MMOL/L (ref 20–33)
CREAT SERPL-MCNC: 1.24 MG/DL (ref 0.5–1.4)
EOSINOPHIL # BLD AUTO: 0.37 K/UL (ref 0–0.51)
EOSINOPHIL NFR BLD: 5.5 % (ref 0–6.9)
ERYTHROCYTE [DISTWIDTH] IN BLOOD BY AUTOMATED COUNT: 47.4 FL (ref 35.9–50)
GLUCOSE SERPL-MCNC: 122 MG/DL (ref 65–99)
HCT VFR BLD AUTO: 24.8 % (ref 37–47)
HGB BLD-MCNC: 8.3 G/DL (ref 12–16)
IMM GRANULOCYTES # BLD AUTO: 0.02 K/UL (ref 0–0.11)
IMM GRANULOCYTES NFR BLD AUTO: 0.3 % (ref 0–0.9)
LYMPHOCYTES # BLD AUTO: 2.2 K/UL (ref 1–4.8)
LYMPHOCYTES NFR BLD: 32.6 % (ref 22–41)
MCH RBC QN AUTO: 30.9 PG (ref 27–33)
MCHC RBC AUTO-ENTMCNC: 33.5 G/DL (ref 33.6–35)
MCV RBC AUTO: 92.2 FL (ref 81.4–97.8)
MONOCYTES # BLD AUTO: 0.89 K/UL (ref 0–0.85)
MONOCYTES NFR BLD AUTO: 13.2 % (ref 0–13.4)
NEUTROPHILS # BLD AUTO: 3.24 K/UL (ref 2–7.15)
NEUTROPHILS NFR BLD: 48.1 % (ref 44–72)
NRBC # BLD AUTO: 0 K/UL
NRBC BLD-RTO: 0 /100 WBC
NT-PROBNP SERPL IA-MCNC: 4151 PG/ML (ref 0–125)
PLATELET # BLD AUTO: 191 K/UL (ref 164–446)
PMV BLD AUTO: 10.8 FL (ref 9–12.9)
POTASSIUM SERPL-SCNC: 3.6 MMOL/L (ref 3.6–5.5)
RBC # BLD AUTO: 2.69 M/UL (ref 4.2–5.4)
SODIUM SERPL-SCNC: 130 MMOL/L (ref 135–145)
TROPONIN T SERPL-MCNC: 27 NG/L (ref 6–19)
WBC # BLD AUTO: 6.7 K/UL (ref 4.8–10.8)

## 2021-02-28 PROCEDURE — G0378 HOSPITAL OBSERVATION PER HR: HCPCS

## 2021-02-28 PROCEDURE — 83880 ASSAY OF NATRIURETIC PEPTIDE: CPT

## 2021-02-28 PROCEDURE — A9270 NON-COVERED ITEM OR SERVICE: HCPCS | Performed by: HOSPITALIST

## 2021-02-28 PROCEDURE — 700102 HCHG RX REV CODE 250 W/ 637 OVERRIDE(OP): Performed by: STUDENT IN AN ORGANIZED HEALTH CARE EDUCATION/TRAINING PROGRAM

## 2021-02-28 PROCEDURE — 700102 HCHG RX REV CODE 250 W/ 637 OVERRIDE(OP): Performed by: HOSPITALIST

## 2021-02-28 PROCEDURE — 80048 BASIC METABOLIC PNL TOTAL CA: CPT

## 2021-02-28 PROCEDURE — 85025 COMPLETE CBC W/AUTO DIFF WBC: CPT

## 2021-02-28 PROCEDURE — A9270 NON-COVERED ITEM OR SERVICE: HCPCS | Performed by: STUDENT IN AN ORGANIZED HEALTH CARE EDUCATION/TRAINING PROGRAM

## 2021-02-28 PROCEDURE — 96372 THER/PROPH/DIAG INJ SC/IM: CPT

## 2021-02-28 PROCEDURE — 84484 ASSAY OF TROPONIN QUANT: CPT

## 2021-02-28 PROCEDURE — 700105 HCHG RX REV CODE 258: Performed by: EMERGENCY MEDICINE

## 2021-02-28 PROCEDURE — 700111 HCHG RX REV CODE 636 W/ 250 OVERRIDE (IP): Performed by: STUDENT IN AN ORGANIZED HEALTH CARE EDUCATION/TRAINING PROGRAM

## 2021-02-28 PROCEDURE — 99217 PR OBSERVATION CARE DISCHARGE: CPT | Performed by: INTERNAL MEDICINE

## 2021-02-28 RX ORDER — ACETAMINOPHEN 325 MG/1
650 TABLET ORAL EVERY 4 HOURS PRN
Status: DISCONTINUED | OUTPATIENT
Start: 2021-02-28 | End: 2021-02-28 | Stop reason: HOSPADM

## 2021-02-28 RX ORDER — FUROSEMIDE 20 MG/1
20 TABLET ORAL DAILY
Qty: 30 TABLET | Refills: 1 | Status: SHIPPED | OUTPATIENT
Start: 2021-03-04

## 2021-02-28 RX ADMIN — OXYCODONE HYDROCHLORIDE AND ACETAMINOPHEN 1 TABLET: 5; 325 TABLET ORAL at 14:59

## 2021-02-28 RX ADMIN — OXYCODONE HYDROCHLORIDE AND ACETAMINOPHEN 1 TABLET: 5; 325 TABLET ORAL at 06:13

## 2021-02-28 RX ADMIN — ENOXAPARIN SODIUM 40 MG: 40 INJECTION SUBCUTANEOUS at 05:28

## 2021-02-28 RX ADMIN — SODIUM CHLORIDE, POTASSIUM CHLORIDE, SODIUM LACTATE AND CALCIUM CHLORIDE: 600; 310; 30; 20 INJECTION, SOLUTION INTRAVENOUS at 09:19

## 2021-02-28 RX ADMIN — ACETAMINOPHEN 650 MG: 325 TABLET ORAL at 02:42

## 2021-02-28 RX ADMIN — SENNOSIDES AND DOCUSATE SODIUM 2 TABLET: 8.6; 5 TABLET ORAL at 05:27

## 2021-02-28 RX ADMIN — SODIUM CHLORIDE, POTASSIUM CHLORIDE, SODIUM LACTATE AND CALCIUM CHLORIDE: 600; 310; 30; 20 INJECTION, SOLUTION INTRAVENOUS at 00:01

## 2021-02-28 ASSESSMENT — FIBROSIS 4 INDEX: FIB4 SCORE: 3.53

## 2021-02-28 NOTE — ASSESSMENT & PLAN NOTE
Sodium 129.  Patient appears not to be hypervolemic.  Suspect that this is likely due to hypovolemic hyponatremia  IV fluids given and Lasix held  Repeat BMP in a.m.

## 2021-02-28 NOTE — PROGRESS NOTES
Report received from SULAIMAN Meraz. Pt is sleeping comfortably in bed and denies any further needs at this time.

## 2021-02-28 NOTE — ASSESSMENT & PLAN NOTE
Patient with a history of hypertension secondary to fibromuscular dysplasia.  Presenting with hypotension therefore will hold antihypertensive medications at this time and reintroduce as the patient's blood pressure returns to normal.

## 2021-02-28 NOTE — H&P
"Hospital Medicine History & Physical Note    Date of Service  2/27/2021    Primary Care Physician  Concepción Madera M.D.    Consultants  None    Code Status  Full Code    Chief Complaint  Chief Complaint   Patient presents with   • Hypotension     Reports shoulder replacement approx 3 days days ago, daughter states she is checking BP at home because \"she takes carvedilol, lisinopril, lasix, and is on pain medication\". States BP at home was \"70s-90s\". Pt. denies fevers or drainage from surgical site, denies cough or SOB. O2 in triage 84% RA, placed on 2L NC.        History of Presenting Illness  80 y.o. female who presented 2/27/2021 with a history of right shoulder replacement, hypertension, fibromuscular dysplasia, hypertension who presents with hypotension.  Daughter at bedside.  Information was obtained for the patient and the daughter.  Patient reportedly had a blood pressure this morning of 77/37.  She has never had any episodes like this in the past.  She has a history of fibromuscular dysplasia and has had difficulty maintaining her blood pressure and follows with nephrology Dr. Rodriguez.  She is on Coreg and Lasix and is supposed to fill a prescription for valsartan and previously had been on lisinopril.  3 days ago patient had a right shoulder replacement performed and had been started on oxycodone 5 mg.  Since that time patient has felt lightheaded and not like herself.  Today she felt like she might fall.  She denies fevers, chills, vomiting.  She does report nausea and lightheadedness.  Patient denies any falls, but has had difficulty getting out of bed.  He denies recent illness or sick contacts.  In the ED patient's blood pressure was 117/61.    Review of Systems  Review of Systems   Constitutional: Positive for malaise/fatigue. Negative for chills and fever.        Lightheadedness   Respiratory: Negative for cough and shortness of breath.    Cardiovascular: Negative for chest pain, palpitations and leg " swelling.   Gastrointestinal: Positive for nausea. Negative for abdominal pain and vomiting.   Genitourinary: Negative for dysuria, frequency and urgency.   Musculoskeletal: Negative for joint pain and myalgias.   Neurological: Positive for weakness. Negative for focal weakness.       Past Medical History   has a past medical history of Arthritis (02/10/2021), CATARACT, Fibromuscular dysplasia (HCC) (2018), High cholesterol, Hypertension, Pain, PONV (postoperative nausea and vomiting), Renal disorder (02/10/2021), Unspecified disorder of thyroid, Urinary bladder disorder, and Urinary incontinence (02/10/2021).    Surgical History   has a past surgical history that includes other orthopedic surgery; gyn surgery; craniotomy (1/1/2014); cataract extraction with iol (Bilateral, 2013); other cardiac surgery; and pr reconstr total shoulder implant (Right, 2/24/2021).     Family History  Difficult for diabetes.  No history of DVT or hypothyroidism.    Social History   reports that she has never smoked. She has never used smokeless tobacco. She reports that she does not drink alcohol and does not use drugs.    Allergies  Allergies   Allergen Reactions   • Diltiazem Rash     Increased BP   • Morphine Vomiting   • Pcn [Penicillins] Rash     Rash throughout the body       Medications  Prior to Admission Medications   Prescriptions Last Dose Informant Patient Reported? Taking?   Ascorbic Acid (VITAMIN C PO) 2/10/2021 at [facility] Patient Yes No   Sig: Take 1 tablet by mouth every day.   Barberry-Oreg Grape-Goldenseal (BERBERINE COMPLEX PO) 2/10/2021 at [facility] Patient Yes No   Sig: Take 1 tablet by mouth every day.   Cholecalciferol (VITAMIN D3 PO) 2/10/2021 at [facility] Patient Yes No   Sig: Take 1 tablet by mouth every day.   Coenzyme Q10 (CO Q10 PO) 2/10/2021 at [facility] Patient Yes No   Sig: Take 1 tablet by mouth every day.   DHEA 25 MG Tab 2/10/2021 at [facility] Patient Yes No   Sig: Take 25 mg by mouth every day.   NON SPECIFIED 2/23/2021 at [facility]  Patient Yes No   Sig: Take 1 capsule by mouth every morning. PS-Thyroid 0.5 grain capsule olive oil veggie (compounded)    NON SPECIFIED 2/10/2021 at [facility] Patient Yes No   Sig: Take 1 tablet by mouth every day. EpiCor 500 mg daily    Omega 3 1200 MG Cap 2/10/2021 at [facility] Patient Yes No   Sig: Take 2 Capsules by mouth every day. With EPA and DHA   carvedilol (COREG) 12.5 MG Tab 2/26/2021 at PM Patient Yes Yes   Sig: Take 12.5 mg by mouth 2 times a day, with meals.   furosemide (LASIX) 20 MG Tab 2/26/2021 at AFTERNOON Patient Yes No   Sig: Take 40 mg by mouth 2 times a day.   ondansetron (ZOFRAN) 4 MG Tab tablet 2/26/2021 at [facility] Patient No No   Sig: Take 1 tablet by mouth every four hours as needed for Nausea/Vomiting for up to 15 days.   oxyCODONE-acetaminophen (PERCOCET) 5-325 MG Tab 2/27/2021 at [facility] Patient Yes Yes   Sig: Take 1 tablet by mouth every four hours as needed.   simvastatin (ZOCOR) 20 MG TABS 2/10/2021 at [facility] Patient No No   Sig: Take 1 Tab by mouth every evening.   vitamin A 3 mg (39345 units) capsule 2/10/2021 at [facility] Patient Yes No   Sig: Take 10,000 Units by mouth see administration instructions. 3 per week       Facility-Administered Medications: None       Physical Exam  Temp:  [36.7 °C (98.1 °F)] 36.7 °C (98.1 °F)  Pulse:  [63-72] 63  Resp:  [15-20] 15  BP: (117-139)/(58-65) 139/65  SpO2:  [96 %-100 %] 100 %    Physical Exam  Vitals and nursing note reviewed.   Constitutional:       General: She is not in acute distress.     Appearance: Normal appearance. She is not toxic-appearing.   HENT:      Head: Normocephalic and atraumatic.   Eyes:      General: No scleral icterus.        Right eye: No discharge.         Left eye: No discharge.      Conjunctiva/sclera: Conjunctivae normal.   Cardiovascular:      Rate and Rhythm: Normal rate and regular rhythm.      Heart sounds: Normal heart sounds. No murmur.   Pulmonary:      Effort: No respiratory distress.      Breath sounds: Normal breath sounds. No  wheezing or rales.   Abdominal:      General: There is no distension.      Tenderness: There is no abdominal tenderness. There is no guarding.   Musculoskeletal:         General: No tenderness.      Right lower leg: No edema.      Left lower leg: No edema.   Skin:     General: Skin is warm and dry.      Coloration: Skin is not jaundiced.   Neurological:      Mental Status: She is alert and oriented to person, place, and time.      Sensory: No sensory deficit.   Psychiatric:         Mood and Affect: Mood normal.         Behavior: Behavior normal.         Thought Content: Thought content normal.         Laboratory:  Recent Labs     02/27/21  1236   WBC 7.2   RBC 2.92*   HEMOGLOBIN 9.1*   HEMATOCRIT 26.9*   MCV 92.1   MCH 31.2   MCHC 33.8   RDW 48.5   PLATELETCT 214   MPV 10.2     Recent Labs     02/27/21  1236   SODIUM 129*   POTASSIUM 3.8   CHLORIDE 90*   CO2 28   GLUCOSE 149*   BUN 21   CREATININE 1.91*   CALCIUM 8.6     Recent Labs     02/27/21  1236   ALTSGPT 10   ASTSGOT 27   ALKPHOSPHAT 53   TBILIRUBIN 0.4   GLUCOSE 149*         No results for input(s): NTPROBNP in the last 72 hours.      Recent Labs     02/27/21  1236   TROPONINT 45*       Imaging:  DX-CHEST-PORTABLE (1 VIEW)   Final Result      No acute cardiopulmonary process is seen.      Rounded metallic density projects over the right upper quadrant measuring 1 cm. Further evaluation can be performed with dedicated imaging of the upper abdomen.      Post surgical changes of the right shoulder.               Assessment/Plan:  I anticipate this patient is appropriate for observation status at this time.    * Hypotension  Assessment & Plan  Patient presenting with hypotension.  Daughter reports that patient's blood pressure at home was 77/37.  At home she takes Coreg, valsartan and Lasix.  Patient has a history of fibromuscular dysplasia with stent placed and follows with Dr. Rodriguez.  She recently had shoulder surgery 3 days ago and was started on oxycodone 5  mg for pain.  It appears that a combination of the antihypertensive medications and pain medications is likely resulted in the hypotension.  Patient has had no symptoms like this in the past and regularly checks her blood pressure and has not noticed these low readings in the past.  Hold antihypertensive medications.  We will gradually reintroduce as the patient's blood pressure increases.  Fall precautions  Admit to telemetry    ROSE (acute kidney injury) (HCC)  Assessment & Plan  Cr 1.91.  Baseline creatinine appears to be 0.7-0.9.  Possibly due to poor perfusion secondary to hypotension.  IV fluids given  Repeat BMP in a.m.  Avoid nephrotoxic agents      Normochromic normocytic anemia  Assessment & Plan  Globin 9.1.  Patient is postop day #3.  Possibly secondary to operative blood loss versus lab error.  Patient denies any active bleeding  Will repeat hemoglobin.    LBBB (left bundle branch block)  Assessment & Plan  LBB on EKG, not a new finding. Present on prior EKG. No complaint of chest pain.    Hypercholesterolemia- (present on admission)  Assessment & Plan  Continue simvastatin    HTN (hypertension)- (present on admission)  Assessment & Plan  Patient with a history of hypertension secondary to fibromuscular dysplasia.  Presenting with hypotension therefore will hold antihypertensive medications at this time and reintroduce as the patient's blood pressure returns to normal.    Hyponatremia- (present on admission)  Assessment & Plan  Sodium 129.  Patient appears not to be hypervolemic.  Suspect that this is likely due to hypovolemic hyponatremia  IV fluids given and Lasix held  Repeat BMP in a.m.

## 2021-02-28 NOTE — DISCHARGE INSTRUCTIONS
Discharge Instructions    Discharged to home by car with relative. Discharged via wheelchair, hospital escort: Yes.  Special equipment needed: Not Applicable    Be sure to schedule a follow-up appointment with your primary care doctor or any specialists as instructed.     Discharge Plan:   Diet Plan: Discussed  Activity Level: Discussed  Confirmed Follow up Appointment: Patient to Call and Schedule Appointment  Confirmed Symptoms Management: Discussed  Medication Reconciliation Updated: Yes  Influenza Vaccine Indication: Patient Refuses    I understand that a diet low in cholesterol, fat, and sodium is recommended for good health. Unless I have been given specific instructions below for another diet, I accept this instruction as my diet prescription.     Special Instructions: None    · Is patient discharged on Warfarin / Coumadin?   No       Hypotension  As your heart beats, it forces blood through your body. Hypotension, commonly called low blood pressure, is when the force of blood pumping through your arteries is too weak. Arteries are blood vessels that carry blood from the heart throughout the body. Depending on the cause and severity, hypotension may be harmless (benign) or may cause serious problems (be critical).  When blood pressure is too low, you may not get enough blood to your brain or to the rest of your organs. This can cause weakness, light-headedness, rapid heartbeat, and fainting.  What are the causes?  This condition may be caused by:  · Blood loss.  · Loss of body fluids (dehydration).  · Heart problems.  · Hormone (endocrine) problems.  · Pregnancy.  · Severe infection.  · Lack of certain nutrients.  · Severe allergic reactions (anaphylaxis).  · Certain medicines, such as blood pressure medicine or medicines that make the body lose excess fluids (diuretics). Sometimes, hypotension may be caused by not taking medicine as directed, such as taking too much of a certain medicine.  What increases the  "risk?  The following factors may make you more likely to develop this condition:  · Age. Risk increases as you get older.  · Conditions that affect the heart or the central nervous system.  · Taking certain medicines, such as blood pressure medicine or diuretics.  · Being pregnant.  What are the signs or symptoms?  Common symptoms of this condition include:  · Weakness.  · Light-headedness.  · Dizziness.  · Blurred vision.  · Fatigue.  · Rapid heartbeat.  · Fainting, in severe cases.  How is this diagnosed?  This condition is diagnosed based on:  · Your medical history.  · Your symptoms.  · Your blood pressure measurement. Your health care provider will check your blood pressure when you are:  ? Lying down.  ? Sitting.  ? Standing.  A blood pressure reading is recorded as two numbers, such as \"120 over 80\" (or 120/80). The first (\"top\") number is called the systolic pressure. It is a measure of the pressure in your arteries as your heart beats. The second (\"bottom\") number is called the diastolic pressure. It is a measure of the pressure in your arteries when your heart relaxes between beats. Blood pressure is measured in a unit called mm Hg. Healthy blood pressure for most adults is 120/80. If your blood pressure is below 90/60, you may be diagnosed with hypotension.  Other information or tests that may be used to diagnose hypotension include:  · Your other vital signs, such as your heart rate and temperature.  · Blood tests.  · Tilt table test. For this test, you will be safely secured to a table that moves you from a lying position to an upright position. Your heart rhythm and blood pressure will be monitored during the test.  How is this treated?  Treatment for this condition may include:  · Changing your diet. This may involve eating more salt (sodium) or drinking more water.  · Taking medicines to raise your blood pressure.  · Changing the dosage of certain medicines you are taking that might be lowering your " blood pressure.  · Wearing compression stockings. These stockings help to prevent blood clots and reduce swelling in your legs.  In some cases, you may need to go to the hospital for:  · Fluid replacement. This means you will receive fluids through an IV.  · Blood replacement. This means you will receive donated blood through an IV (transfusion).  · Treating an infection or heart problems, if this applies.  · Monitoring. You may need to be monitored while medicines that you are taking wear off.  Follow these instructions at home:  Eating and drinking    · Drink enough fluid to keep your urine pale yellow.  · Eat a healthy diet, and follow instructions from your health care provider about eating or drinking restrictions. A healthy diet includes:  ? Fresh fruits and vegetables.  ? Whole grains.  ? Lean meats.  ? Low-fat dairy products.  · Eat extra salt only as directed. Do not add extra salt to your diet unless your health care provider told you to do that.  · Eat frequent, small meals.  · Avoid standing up suddenly after eating.  Medicines  · Take over-the-counter and prescription medicines only as told by your health care provider.  ? Follow instructions from your health care provider about changing the dosage of your current medicines, if this applies.  ? Do not stop or adjust any of your medicines on your own.  General instructions    · Wear compression stockings as told by your health care provider.  · Get up slowly from lying down or sitting positions. This gives your blood pressure a chance to adjust.  · Avoid hot showers and excessive heat as directed by your health care provider.  · Return to your normal activities as told by your health care provider. Ask your health care provider what activities are safe for you.  · Do not use any products that contain nicotine or tobacco, such as cigarettes, e-cigarettes, and chewing tobacco. If you need help quitting, ask your health care provider.  · Keep all follow-up  visits as told by your health care provider. This is important.  Contact a health care provider if you:  · Vomit.  · Have diarrhea.  · Have a fever for more than 2-3 days.  · Feel more thirsty than usual.  · Feel weak and tired.  Get help right away if you:  · Have chest pain.  · Have a fast or irregular heartbeat.  · Develop numbness in any part of your body.  · Cannot move your arms or your legs.  · Have trouble speaking.  · Become sweaty or feel light-headed.  · Faint.  · Feel short of breath.  · Have trouble staying awake.  · Feel confused.  Summary  · Hypotension is when the force of blood pumping through your arteries is too weak.  · Hypotension may be harmless (benign) or may cause serious problems (be critical).  · Treatment for this condition may include changing your diet, changing your medicines, and wearing compression stockings.  · In some cases, you may need to go to the hospital for fluid or blood replacement.  This information is not intended to replace advice given to you by your health care provider. Make sure you discuss any questions you have with your health care provider.  Document Released: 12/18/2006 Document Revised: 06/13/2019 Document Reviewed: 06/13/2019  [employer] Patient Education © 2020 [employer]    Depression / Suicide Risk    As you are discharged from this [facility] facility, it is important to learn how to keep safe from harming yourself.    Recognize the warning signs:  · Abrupt changes in personality, positive or negative- including increase in energy   · Giving away possessions  · Change in eating patterns- significant weight changes-  positive or negative  · Change in sleeping patterns- unable to sleep or sleeping all the time   · Unwillingness or inability to communicate  · Depression  · Unusual sadness, discouragement and loneliness  · Talk of wanting to die  · Neglect of personal appearance   · Rebelliousness- reckless behavior  · Withdrawal from people/activities  they love  · Confusion- inability to concentrate     If you or a loved one observes any of these behaviors or has concerns about self-harm, here's what you can do:  · Talk about it- your feelings and reasons for harming yourself  · Remove any means that you might use to hurt yourself (examples: pills, rope, extension cords, firearm)  · Get professional help from the community (Mental Health, Substance Abuse, psychological counseling)  · Do not be alone:Call your Safe Contact- someone whom you trust who will be there for you.  · Call your local CRISIS HOTLINE 413-5318 or 058-859-8374  · Call your local Children's Mobile Crisis Response Team [place] (404) 298-6683 or www.[employer]  · Call the toll free National Suicide Prevention Hotlines   · National Suicide Prevention Lifeline 110-836-JPAV (5542)  · National Hope Line Network 800-SUICIDE (881-0931)

## 2021-02-28 NOTE — PROGRESS NOTES
Report received by Lorelei HILARIO. Plan of care discussed. Safety measures in place, call light in reach.    Assessment complete. Complains of RT shoulder pain, see MAR for intervention. No other concerns at this time.    Report given to Tesha HILARIO. Plan of care discussed. Care released.

## 2021-02-28 NOTE — CARE PLAN
Problem: Respiratory:  Goal: Respiratory status will improve  Outcome: PROGRESSING AS EXPECTED     Problem: Pain Management  Goal: Pain level will decrease to patient's comfort goal  Outcome: PROGRESSING AS EXPECTED     Problem: Bowel/Gastric:  Goal: Normal bowel function is maintained or improved  Outcome: PROGRESSING AS EXPECTED  Goal: Will not experience complications related to bowel motility  Outcome: PROGRESSING AS EXPECTED     Problem: Safety  Goal: Will remain free from injury  Outcome: PROGRESSING AS EXPECTED  Goal: Will remain free from falls  Outcome: PROGRESSING AS EXPECTED     Problem: Communication  Goal: The ability to communicate needs accurately and effectively will improve  Outcome: PROGRESSING AS EXPECTED

## 2021-02-28 NOTE — ASSESSMENT & PLAN NOTE
Globin 9.1.  Patient is postop day #3.  Possibly secondary to operative blood loss versus lab error.  Patient denies any active bleeding  Will repeat hemoglobin.

## 2021-02-28 NOTE — DISCHARGE SUMMARY
"Discharge Summary    CHIEF COMPLAINT ON ADMISSION  Chief Complaint   Patient presents with   • Hypotension     Reports shoulder replacement approx 3 days days ago, daughter states she is checking BP at home because \"she takes carvedilol, lisinopril, lasix, and is on pain medication\". States BP at home was \"70s-90s\". Pt. denies fevers or drainage from surgical site, denies cough or SOB. O2 in triage 84% RA, placed on 2L NC.        Reason for Admission  Low Blood Pressure     Admission Date  2/27/2021    CODE STATUS  Full Code    HPI & HOSPITAL COURSE  This is a 80 y.o. female with a history of hypertension status post recent right total shoulder, hyperlipidemia admitted for generalized weakness and dizziness found to have hypotension in the 70s.  She had recently been started on Percocet after her shoulder surgery 3 days prior and had had a low appetite since her surgery.  She has been taking Lasix 40 mg p.o. twice daily as well as Coreg and lisinopril.  She was noted to have acute kidney injury therefore lisinopril was discontinued as was Lasix.  Her Coreg was held due to hypotension and she was provided IV fluids.  Initially she measured oxygen saturation of 84% however was quickly able to wean to room air.  She had no evidence of pneumonia or fluid overload.  Overnight, her blood pressure improved significantly to the normal range and her symptoms resolved.  Her home medication regimen was discussed with her and it was decided to reduce her lasix to only 20mg daily.   She will also discontinue her ACE inhibitor.  She will continue taking carvedilol 12.5 twice daily and will discuss with her primary care provider if she needs any additional medications.  She will continue taking up to 4 tablets of Percocet daily however we will not take more than this.    She was eating energy normally was able to maintain hydration with oral fluids.  She ambulated and had no further sensation of dizziness      Therefore, she is " discharged in good and stable condition to home with close outpatient follow-up.    The patient met 2-midnight criteria for an inpatient stay at the time of discharge.    Discharge Date  2/28/2021    FOLLOW UP ITEMS POST DISCHARGE  Follow-up with PCP within 1 week to have blood pressure repeated and medications adjusted appropriately.  Follow-up with orthopedic surgery as instructed regarding right shoulder surgery    DISCHARGE DIAGNOSES  Principal Problem:    Hypotension POA: Unknown  Active Problems:    ROSE (acute kidney injury) (HCC) POA: Unknown    HTN (hypertension) POA: Yes    Hypercholesterolemia POA: Yes    LBBB (left bundle branch block) POA: Unknown    Normochromic normocytic anemia POA: Unknown    Hyponatremia POA: Yes  Resolved Problems:    * No resolved hospital problems. *      FOLLOW UP  No future appointments.  No follow-up provider specified.    MEDICATIONS ON DISCHARGE     Medication List      CHANGE how you take these medications      Instructions   furosemide 20 MG Tabs  Start taking on: March 4, 2021  What changed:   · how much to take  · when to take this  · These instructions start on March 4, 2021. If you are unsure what to do until then, ask your doctor or other care provider.  Commonly known as: LASIX   Take 1 tablet by mouth every day.  Dose: 20 mg        CONTINUE taking these medications      Instructions   BERBERINE COMPLEX PO   Take 1 tablet by mouth every day.  Dose: 1 tablet     carvedilol 12.5 MG Tabs  Commonly known as: COREG   Take 12.5 mg by mouth 2 times a day, with meals.  Dose: 12.5 mg     CO Q10 PO   Take 1 tablet by mouth every day.  Dose: 1 tablet     DHEA 25 MG Tabs   Take 25 mg by mouth every day.  Dose: 25 mg     NON SPECIFIED   Take 1 capsule by mouth every morning. PS-Thyroid 0.5 grain capsule olive oil veggie (compounded)  Dose: 1 capsule     NON SPECIFIED   Take 1 tablet by mouth every day. EpiCor 500 mg daily  Dose: 1 tablet     Omega 3 1200 MG Caps   Take 2 Capsules  by mouth every day. With EPA and DHA  Dose: 2 capsule     ondansetron 4 MG Tabs tablet  Commonly known as: Zofran   Take 1 tablet by mouth every four hours as needed for Nausea/Vomiting for up to 15 days.  Dose: 4 mg     oxyCODONE-acetaminophen 5-325 MG Tabs  Commonly known as: PERCOCET   Take 1 tablet by mouth every four hours as needed.  Dose: 1 tablet     simvastatin 20 MG Tabs  Commonly known as: ZOCOR   Take 1 Tab by mouth every evening.  Dose: 20 mg     vitamin A 3 mg (78311 units) capsule   Take 10,000 Units by mouth see administration instructions. 3 per week  Dose: 10,000 Units     VITAMIN C PO   Take 1 tablet by mouth every day.  Dose: 1 tablet     VITAMIN D3 PO   Take 1 tablet by mouth every day.  Dose: 1 tablet            Allergies  Allergies   Allergen Reactions   • Diltiazem Rash     Increased BP   • Morphine Vomiting   • Pcn [Penicillins] Rash     Rash throughout the body       DIET  Orders Placed This Encounter   Procedures   • Diet Order Diet: Regular     Standing Status:   Standing     Number of Occurrences:   1     Order Specific Question:   Diet:     Answer:   Regular [1]       ACTIVITY  As tolerated.-Limitations as previously specified for right shoulder related to recent surgery  Weight bearing as tolerated    CONSULTATIONS  None    PROCEDURES  None    LABORATORY  Lab Results   Component Value Date    SODIUM 130 (L) 02/28/2021    POTASSIUM 3.6 02/28/2021    CHLORIDE 93 (L) 02/28/2021    CO2 28 02/28/2021    GLUCOSE 122 (H) 02/28/2021    BUN 19 02/28/2021    CREATININE 1.24 02/28/2021        Lab Results   Component Value Date    WBC 6.7 02/28/2021    HEMOGLOBIN 8.3 (L) 02/28/2021    HEMATOCRIT 24.8 (L) 02/28/2021    PLATELETCT 191 02/28/2021        Total time of the discharge process exceeds 39 minutes.

## 2021-02-28 NOTE — ASSESSMENT & PLAN NOTE
Patient presenting with hypotension.  Daughter reports that patient's blood pressure at home was 77/37.  At home she takes Coreg, valsartan and Lasix.  Patient has a history of fibromuscular dysplasia with stent placed and follows with Dr. Rodriguez.  She recently had shoulder surgery 3 days ago and was started on oxycodone 5 mg for pain.  It appears that a combination of the antihypertensive medications and pain medications is likely resulted in the hypotension.  Patient has had no symptoms like this in the past and regularly checks her blood pressure and has not noticed these low readings in the past.  Hold antihypertensive medications.  We will gradually reintroduce as the patient's blood pressure increases.  Fall precautions  Admit to telemetry

## 2021-02-28 NOTE — FLOWSHEET NOTE
Telemetry Shift Summary     Rhythm: SR  HR Range: 60-80  Ectopy:   Measurements: .18/.14/.44                Normal Values  Rhythm SR  HR Range    Measurements 0.12-0.20 / 0.06-0.10  / 0.30-0.52

## 2021-02-28 NOTE — PROGRESS NOTES
Telemetry Shift Summary      Rhythm: SR w/ BBB  HR Range: 60-67  Ectopy: R PAC, R PVC  Measurements: .18/.12/.42    Normal Values:  Rhythm: SR  HR Range:   Measurements: 0.12-0.20/ 0.06-0.10/ 0.30-0.52

## 2021-03-01 ENCOUNTER — HOSPITAL ENCOUNTER (INPATIENT)
Facility: MEDICAL CENTER | Age: 81
LOS: 2 days | DRG: 682 | End: 2021-03-04
Attending: EMERGENCY MEDICINE | Admitting: STUDENT IN AN ORGANIZED HEALTH CARE EDUCATION/TRAINING PROGRAM
Payer: MEDICARE

## 2021-03-01 ENCOUNTER — APPOINTMENT (OUTPATIENT)
Dept: RADIOLOGY | Facility: MEDICAL CENTER | Age: 81
DRG: 682 | End: 2021-03-01
Attending: EMERGENCY MEDICINE
Payer: MEDICARE

## 2021-03-01 ENCOUNTER — APPOINTMENT (OUTPATIENT)
Dept: CARDIOLOGY | Facility: MEDICAL CENTER | Age: 81
DRG: 682 | End: 2021-03-01
Attending: INTERNAL MEDICINE
Payer: MEDICARE

## 2021-03-01 DIAGNOSIS — R07.9 CHEST PAIN, UNSPECIFIED TYPE: ICD-10-CM

## 2021-03-01 DIAGNOSIS — R53.81 DEBILITY: ICD-10-CM

## 2021-03-01 DIAGNOSIS — J96.01 ACUTE RESPIRATORY FAILURE WITH HYPOXIA (HCC): ICD-10-CM

## 2021-03-01 DIAGNOSIS — M25.511 RIGHT SHOULDER PAIN, UNSPECIFIED CHRONICITY: ICD-10-CM

## 2021-03-01 PROBLEM — R79.89 ELEVATED TSH: Status: ACTIVE | Noted: 2018-01-01

## 2021-03-01 PROBLEM — K59.00 CN (CONSTIPATION): Status: ACTIVE | Noted: 2018-01-01

## 2021-03-01 PROBLEM — I77.3 FIBROMUSCULAR DYSPLASIA (HCC): Status: ACTIVE | Noted: 2018-01-01

## 2021-03-01 LAB
ALBUMIN SERPL BCP-MCNC: 3.7 G/DL (ref 3.2–4.9)
ALBUMIN/GLOB SERPL: 1.2 G/DL
ALP SERPL-CCNC: 53 U/L (ref 30–99)
ALT SERPL-CCNC: 10 U/L (ref 2–50)
ANION GAP SERPL CALC-SCNC: 10 MMOL/L (ref 7–16)
APPEARANCE UR: CLEAR
AST SERPL-CCNC: 24 U/L (ref 12–45)
BASOPHILS # BLD AUTO: 0.2 % (ref 0–1.8)
BASOPHILS # BLD: 0.01 K/UL (ref 0–0.12)
BILIRUB SERPL-MCNC: 0.6 MG/DL (ref 0.1–1.5)
BILIRUB UR QL STRIP.AUTO: NEGATIVE
BUN SERPL-MCNC: 10 MG/DL (ref 8–22)
CALCIUM SERPL-MCNC: 9 MG/DL (ref 8.4–10.2)
CHLORIDE SERPL-SCNC: 93 MMOL/L (ref 96–112)
CO2 SERPL-SCNC: 31 MMOL/L (ref 20–33)
COLOR UR: YELLOW
CREAT SERPL-MCNC: 0.85 MG/DL (ref 0.5–1.4)
EKG IMPRESSION: NORMAL
EOSINOPHIL # BLD AUTO: 0.16 K/UL (ref 0–0.51)
EOSINOPHIL NFR BLD: 2.9 % (ref 0–6.9)
ERYTHROCYTE [DISTWIDTH] IN BLOOD BY AUTOMATED COUNT: 47.4 FL (ref 35.9–50)
GLOBULIN SER CALC-MCNC: 3.1 G/DL (ref 1.9–3.5)
GLUCOSE SERPL-MCNC: 139 MG/DL (ref 65–99)
GLUCOSE UR STRIP.AUTO-MCNC: NEGATIVE MG/DL
HCT VFR BLD AUTO: 26.1 % (ref 37–47)
HGB BLD-MCNC: 8.8 G/DL (ref 12–16)
IMM GRANULOCYTES # BLD AUTO: 0.02 K/UL (ref 0–0.11)
IMM GRANULOCYTES NFR BLD AUTO: 0.4 % (ref 0–0.9)
KETONES UR STRIP.AUTO-MCNC: NEGATIVE MG/DL
LACTATE BLD-SCNC: 1.1 MMOL/L (ref 0.5–2)
LEUKOCYTE ESTERASE UR QL STRIP.AUTO: NEGATIVE
LV EJECT FRACT  99904: 60
LV EJECT FRACT MOD 2C 99903: 54.55
LV EJECT FRACT MOD 4C 99902: 62.4
LV EJECT FRACT MOD BP 99901: 56.23
LYMPHOCYTES # BLD AUTO: 1.43 K/UL (ref 1–4.8)
LYMPHOCYTES NFR BLD: 25.7 % (ref 22–41)
MCH RBC QN AUTO: 31.2 PG (ref 27–33)
MCHC RBC AUTO-ENTMCNC: 33.7 G/DL (ref 33.6–35)
MCV RBC AUTO: 92.6 FL (ref 81.4–97.8)
MICRO URNS: NORMAL
MONOCYTES # BLD AUTO: 0.59 K/UL (ref 0–0.85)
MONOCYTES NFR BLD AUTO: 10.6 % (ref 0–13.4)
NEUTROPHILS # BLD AUTO: 3.35 K/UL (ref 2–7.15)
NEUTROPHILS NFR BLD: 60.2 % (ref 44–72)
NITRITE UR QL STRIP.AUTO: NEGATIVE
NRBC # BLD AUTO: 0 K/UL
NRBC BLD-RTO: 0 /100 WBC
NT-PROBNP SERPL IA-MCNC: 5242 PG/ML (ref 0–125)
PH UR STRIP.AUTO: 7 [PH] (ref 5–8)
PLATELET # BLD AUTO: 273 K/UL (ref 164–446)
PMV BLD AUTO: 9.8 FL (ref 9–12.9)
POTASSIUM SERPL-SCNC: 3.9 MMOL/L (ref 3.6–5.5)
PROT SERPL-MCNC: 6.8 G/DL (ref 6–8.2)
PROT UR QL STRIP: NEGATIVE MG/DL
RBC # BLD AUTO: 2.82 M/UL (ref 4.2–5.4)
RBC UR QL AUTO: NEGATIVE
SARS-COV-2 RNA RESP QL NAA+PROBE: NOTDETECTED
SODIUM SERPL-SCNC: 134 MMOL/L (ref 135–145)
SP GR UR STRIP.AUTO: 1.01
SPECIMEN SOURCE: NORMAL
TROPONIN T SERPL-MCNC: 20 NG/L (ref 6–19)
WBC # BLD AUTO: 5.6 K/UL (ref 4.8–10.8)

## 2021-03-01 PROCEDURE — 700101 HCHG RX REV CODE 250: Performed by: INTERNAL MEDICINE

## 2021-03-01 PROCEDURE — 99285 EMERGENCY DEPT VISIT HI MDM: CPT

## 2021-03-01 PROCEDURE — 83605 ASSAY OF LACTIC ACID: CPT

## 2021-03-01 PROCEDURE — 96374 THER/PROPH/DIAG INJ IV PUSH: CPT

## 2021-03-01 PROCEDURE — 85025 COMPLETE CBC W/AUTO DIFF WBC: CPT

## 2021-03-01 PROCEDURE — 700111 HCHG RX REV CODE 636 W/ 250 OVERRIDE (IP): Performed by: EMERGENCY MEDICINE

## 2021-03-01 PROCEDURE — 700102 HCHG RX REV CODE 250 W/ 637 OVERRIDE(OP): Performed by: EMERGENCY MEDICINE

## 2021-03-01 PROCEDURE — 700111 HCHG RX REV CODE 636 W/ 250 OVERRIDE (IP): Performed by: INTERNAL MEDICINE

## 2021-03-01 PROCEDURE — 93005 ELECTROCARDIOGRAM TRACING: CPT | Performed by: EMERGENCY MEDICINE

## 2021-03-01 PROCEDURE — 99220 PR INITIAL OBSERVATION CARE,LEVL III: CPT | Performed by: INTERNAL MEDICINE

## 2021-03-01 PROCEDURE — 94760 N-INVAS EAR/PLS OXIMETRY 1: CPT

## 2021-03-01 PROCEDURE — 71275 CT ANGIOGRAPHY CHEST: CPT | Mod: MG

## 2021-03-01 PROCEDURE — 81003 URINALYSIS AUTO W/O SCOPE: CPT

## 2021-03-01 PROCEDURE — 80053 COMPREHEN METABOLIC PANEL: CPT

## 2021-03-01 PROCEDURE — 700117 HCHG RX CONTRAST REV CODE 255: Performed by: EMERGENCY MEDICINE

## 2021-03-01 PROCEDURE — G0378 HOSPITAL OBSERVATION PER HR: HCPCS

## 2021-03-01 PROCEDURE — A9270 NON-COVERED ITEM OR SERVICE: HCPCS | Performed by: INTERNAL MEDICINE

## 2021-03-01 PROCEDURE — 93005 ELECTROCARDIOGRAM TRACING: CPT | Performed by: INTERNAL MEDICINE

## 2021-03-01 PROCEDURE — 84484 ASSAY OF TROPONIN QUANT: CPT

## 2021-03-01 PROCEDURE — 700117 HCHG RX CONTRAST REV CODE 255: Performed by: INTERNAL MEDICINE

## 2021-03-01 PROCEDURE — 93306 TTE W/DOPPLER COMPLETE: CPT | Mod: 26 | Performed by: INTERNAL MEDICINE

## 2021-03-01 PROCEDURE — 36415 COLL VENOUS BLD VENIPUNCTURE: CPT

## 2021-03-01 PROCEDURE — 96376 TX/PRO/DX INJ SAME DRUG ADON: CPT

## 2021-03-01 PROCEDURE — U0005 INFEC AGEN DETEC AMPLI PROBE: HCPCS

## 2021-03-01 PROCEDURE — 83880 ASSAY OF NATRIURETIC PEPTIDE: CPT

## 2021-03-01 PROCEDURE — 71045 X-RAY EXAM CHEST 1 VIEW: CPT

## 2021-03-01 PROCEDURE — 96372 THER/PROPH/DIAG INJ SC/IM: CPT

## 2021-03-01 PROCEDURE — 93306 TTE W/DOPPLER COMPLETE: CPT

## 2021-03-01 PROCEDURE — 96375 TX/PRO/DX INJ NEW DRUG ADDON: CPT

## 2021-03-01 PROCEDURE — U0003 INFECTIOUS AGENT DETECTION BY NUCLEIC ACID (DNA OR RNA); SEVERE ACUTE RESPIRATORY SYNDROME CORONAVIRUS 2 (SARS-COV-2) (CORONAVIRUS DISEASE [COVID-19]), AMPLIFIED PROBE TECHNIQUE, MAKING USE OF HIGH THROUGHPUT TECHNOLOGIES AS DESCRIBED BY CMS-2020-01-R: HCPCS

## 2021-03-01 PROCEDURE — 700102 HCHG RX REV CODE 250 W/ 637 OVERRIDE(OP): Performed by: INTERNAL MEDICINE

## 2021-03-01 PROCEDURE — A9270 NON-COVERED ITEM OR SERVICE: HCPCS | Performed by: EMERGENCY MEDICINE

## 2021-03-01 RX ORDER — ONDANSETRON 2 MG/ML
4 INJECTION INTRAMUSCULAR; INTRAVENOUS EVERY 4 HOURS PRN
Status: DISCONTINUED | OUTPATIENT
Start: 2021-03-01 | End: 2021-03-04 | Stop reason: HOSPADM

## 2021-03-01 RX ORDER — LISINOPRIL 5 MG/1
5 TABLET ORAL
Status: DISCONTINUED | OUTPATIENT
Start: 2021-03-01 | End: 2021-03-04 | Stop reason: HOSPADM

## 2021-03-01 RX ORDER — ATORVASTATIN CALCIUM 20 MG/1
20 TABLET, FILM COATED ORAL EVERY EVENING
Status: DISCONTINUED | OUTPATIENT
Start: 2021-03-01 | End: 2021-03-04 | Stop reason: HOSPADM

## 2021-03-01 RX ORDER — HYDROMORPHONE HYDROCHLORIDE 1 MG/ML
0.5 INJECTION, SOLUTION INTRAMUSCULAR; INTRAVENOUS; SUBCUTANEOUS ONCE
Status: DISCONTINUED | OUTPATIENT
Start: 2021-03-01 | End: 2021-03-01

## 2021-03-01 RX ORDER — BISACODYL 10 MG
10 SUPPOSITORY, RECTAL RECTAL
Status: DISCONTINUED | OUTPATIENT
Start: 2021-03-01 | End: 2021-03-01

## 2021-03-01 RX ORDER — BISACODYL 10 MG
10 SUPPOSITORY, RECTAL RECTAL
Status: DISCONTINUED | OUTPATIENT
Start: 2021-03-01 | End: 2021-03-04 | Stop reason: HOSPADM

## 2021-03-01 RX ORDER — KETOROLAC TROMETHAMINE 30 MG/ML
30 INJECTION, SOLUTION INTRAMUSCULAR; INTRAVENOUS ONCE
Status: DISCONTINUED | OUTPATIENT
Start: 2021-03-01 | End: 2021-03-01

## 2021-03-01 RX ORDER — FUROSEMIDE 10 MG/ML
20 INJECTION INTRAMUSCULAR; INTRAVENOUS ONCE
Status: COMPLETED | OUTPATIENT
Start: 2021-03-01 | End: 2021-03-01

## 2021-03-01 RX ORDER — ENEMA 19; 7 G/133ML; G/133ML
1 ENEMA RECTAL ONCE
Status: COMPLETED | OUTPATIENT
Start: 2021-03-01 | End: 2021-03-02

## 2021-03-01 RX ORDER — POLYETHYLENE GLYCOL 3350 17 G/17G
1 POWDER, FOR SOLUTION ORAL
Status: DISCONTINUED | OUTPATIENT
Start: 2021-03-01 | End: 2021-03-01

## 2021-03-01 RX ORDER — FUROSEMIDE 10 MG/ML
40 INJECTION INTRAMUSCULAR; INTRAVENOUS
Status: DISCONTINUED | OUTPATIENT
Start: 2021-03-01 | End: 2021-03-02

## 2021-03-01 RX ORDER — AMOXICILLIN 250 MG
2 CAPSULE ORAL 2 TIMES DAILY
Status: DISCONTINUED | OUTPATIENT
Start: 2021-03-01 | End: 2021-03-04 | Stop reason: HOSPADM

## 2021-03-01 RX ORDER — OXYCODONE HYDROCHLORIDE AND ACETAMINOPHEN 5; 325 MG/1; MG/1
1 TABLET ORAL EVERY 8 HOURS PRN
Status: DISCONTINUED | OUTPATIENT
Start: 2021-03-01 | End: 2021-03-02

## 2021-03-01 RX ORDER — MORPHINE SULFATE 4 MG/ML
2 INJECTION, SOLUTION INTRAMUSCULAR; INTRAVENOUS ONCE
Status: COMPLETED | OUTPATIENT
Start: 2021-03-01 | End: 2021-03-01

## 2021-03-01 RX ORDER — ONDANSETRON 4 MG/1
4 TABLET, ORALLY DISINTEGRATING ORAL ONCE
Status: DISCONTINUED | OUTPATIENT
Start: 2021-03-01 | End: 2021-03-01

## 2021-03-01 RX ORDER — ASPIRIN 81 MG/1
324 TABLET, CHEWABLE ORAL ONCE
Status: COMPLETED | OUTPATIENT
Start: 2021-03-01 | End: 2021-03-01

## 2021-03-01 RX ORDER — POLYETHYLENE GLYCOL 3350 17 G/17G
1 POWDER, FOR SOLUTION ORAL 3 TIMES DAILY
Status: DISCONTINUED | OUTPATIENT
Start: 2021-03-01 | End: 2021-03-04 | Stop reason: HOSPADM

## 2021-03-01 RX ORDER — CARVEDILOL 6.25 MG/1
12.5 TABLET ORAL 2 TIMES DAILY WITH MEALS
Status: DISCONTINUED | OUTPATIENT
Start: 2021-03-01 | End: 2021-03-04 | Stop reason: HOSPADM

## 2021-03-01 RX ORDER — SIMVASTATIN 20 MG
20 TABLET ORAL EVERY EVENING
Status: DISCONTINUED | OUTPATIENT
Start: 2021-03-01 | End: 2021-03-01

## 2021-03-01 RX ORDER — AMINOPHYLLINE 25 MG/ML
100 INJECTION, SOLUTION INTRAVENOUS
Status: DISCONTINUED | OUTPATIENT
Start: 2021-03-01 | End: 2021-03-04 | Stop reason: HOSPADM

## 2021-03-01 RX ORDER — AMOXICILLIN 250 MG
2 CAPSULE ORAL 2 TIMES DAILY
Status: DISCONTINUED | OUTPATIENT
Start: 2021-03-01 | End: 2021-03-01

## 2021-03-01 RX ORDER — ONDANSETRON 2 MG/ML
4 INJECTION INTRAMUSCULAR; INTRAVENOUS ONCE
Status: COMPLETED | OUTPATIENT
Start: 2021-03-01 | End: 2021-03-01

## 2021-03-01 RX ORDER — ACETAMINOPHEN 500 MG
1000 TABLET ORAL EVERY 6 HOURS PRN
COMMUNITY

## 2021-03-01 RX ORDER — REGADENOSON 0.08 MG/ML
0.4 INJECTION, SOLUTION INTRAVENOUS
Status: COMPLETED | OUTPATIENT
Start: 2021-03-01 | End: 2021-03-02

## 2021-03-01 RX ADMIN — LISINOPRIL 5 MG: 5 TABLET ORAL at 14:41

## 2021-03-01 RX ADMIN — HUMAN ALBUMIN MICROSPHERES AND PERFLUTREN 3 ML: 10; .22 INJECTION, SOLUTION INTRAVENOUS at 16:05

## 2021-03-01 RX ADMIN — MORPHINE SULFATE 2 MG: 4 INJECTION INTRAVENOUS at 11:02

## 2021-03-01 RX ADMIN — ENOXAPARIN SODIUM 40 MG: 40 INJECTION SUBCUTANEOUS at 14:42

## 2021-03-01 RX ADMIN — POLYETHYLENE GLYCOL 3350 1 PACKET: 17 POWDER, FOR SOLUTION ORAL at 14:41

## 2021-03-01 RX ADMIN — IOHEXOL 50 ML: 350 INJECTION, SOLUTION INTRAVENOUS at 11:51

## 2021-03-01 RX ADMIN — OXYCODONE HYDROCHLORIDE AND ACETAMINOPHEN 1 TABLET: 5; 325 TABLET ORAL at 19:57

## 2021-03-01 RX ADMIN — ASPIRIN 324 MG: 81 TABLET, CHEWABLE ORAL at 14:40

## 2021-03-01 RX ADMIN — FUROSEMIDE 20 MG: 10 INJECTION, SOLUTION INTRAMUSCULAR; INTRAVENOUS at 14:41

## 2021-03-01 RX ADMIN — POLYETHYLENE GLYCOL 3350 1 PACKET: 17 POWDER, FOR SOLUTION ORAL at 17:22

## 2021-03-01 RX ADMIN — ONDANSETRON 4 MG: 2 INJECTION INTRAMUSCULAR; INTRAVENOUS at 11:15

## 2021-03-01 RX ADMIN — SENNOSIDES AND DOCUSATE SODIUM 2 TABLET: 8.6; 5 TABLET ORAL at 17:21

## 2021-03-01 RX ADMIN — BISACODYL 10 MG: 10 SUPPOSITORY RECTAL at 16:06

## 2021-03-01 RX ADMIN — FUROSEMIDE 40 MG: 10 INJECTION, SOLUTION INTRAMUSCULAR; INTRAVENOUS at 16:06

## 2021-03-01 RX ADMIN — CARVEDILOL 12.5 MG: 6.25 TABLET, FILM COATED ORAL at 17:22

## 2021-03-01 RX ADMIN — ATORVASTATIN CALCIUM 20 MG: 20 TABLET, FILM COATED ORAL at 17:22

## 2021-03-01 ASSESSMENT — ENCOUNTER SYMPTOMS
HEARTBURN: 0
NAUSEA: 0
FEVER: 0
PALPITATIONS: 0
NEUROLOGICAL NEGATIVE: 1
ORTHOPNEA: 0
SHORTNESS OF BREATH: 1
CLAUDICATION: 0
HEMOPTYSIS: 0
VOMITING: 0
COUGH: 0
ABDOMINAL PAIN: 0
SPUTUM PRODUCTION: 0
CHILLS: 0
DIARRHEA: 0
WEIGHT LOSS: 0
CONSTIPATION: 1
MUSCULOSKELETAL NEGATIVE: 1
PSYCHIATRIC NEGATIVE: 1

## 2021-03-01 ASSESSMENT — LIFESTYLE VARIABLES
AVERAGE NUMBER OF DAYS PER WEEK YOU HAVE A DRINK CONTAINING ALCOHOL: 0
TOTAL SCORE: 0
EVER HAD A DRINK FIRST THING IN THE MORNING TO STEADY YOUR NERVES TO GET RID OF A HANGOVER: NO
ALCOHOL_USE: NO
TOTAL SCORE: 0
ON A TYPICAL DAY WHEN YOU DRINK ALCOHOL HOW MANY DRINKS DO YOU HAVE: 0
HAVE YOU EVER FELT YOU SHOULD CUT DOWN ON YOUR DRINKING: NO
HOW MANY TIMES IN THE PAST YEAR HAVE YOU HAD 5 OR MORE DRINKS IN A DAY: 0
TOTAL SCORE: 0
EVER FELT BAD OR GUILTY ABOUT YOUR DRINKING: NO
HAVE PEOPLE ANNOYED YOU BY CRITICIZING YOUR DRINKING: NO
CONSUMPTION TOTAL: NEGATIVE

## 2021-03-01 ASSESSMENT — COGNITIVE AND FUNCTIONAL STATUS - GENERAL
DRESSING REGULAR UPPER BODY CLOTHING: A LITTLE
SUGGESTED CMS G CODE MODIFIER DAILY ACTIVITY: CK
STANDING UP FROM CHAIR USING ARMS: A LITTLE
TOILETING: A LITTLE
TURNING FROM BACK TO SIDE WHILE IN FLAT BAD: A LITTLE
EATING MEALS: A LITTLE
DAILY ACTIVITIY SCORE: 18
HELP NEEDED FOR BATHING: A LITTLE
MOBILITY SCORE: 20
WALKING IN HOSPITAL ROOM: A LITTLE
DRESSING REGULAR LOWER BODY CLOTHING: A LITTLE
SUGGESTED CMS G CODE MODIFIER MOBILITY: CJ
CLIMB 3 TO 5 STEPS WITH RAILING: A LITTLE
PERSONAL GROOMING: A LITTLE

## 2021-03-01 ASSESSMENT — FIBROSIS 4 INDEX: FIB4 SCORE: 3.53

## 2021-03-01 NOTE — ASSESSMENT & PLAN NOTE
improved  Came with blood pressure more than 170/90  Continue Coreg 12.5 mg twice daily  We will start with small dose of lisinopril 5 mg daily  Start lasix 20 mg PO daily  Adjust the medication if needed

## 2021-03-01 NOTE — ASSESSMENT & PLAN NOTE
Complicated with chronic kidney disease  Creatinine 0.8 and GFR more than 50 on admission  Patient received contrast for CTA  Monitor kidney function daily and avoid nephrotoxic medication

## 2021-03-01 NOTE — FLOWSHEET NOTE
Telemetry Shift Summary     Rhythm: SR, BBB  HR Range: 70-80  Ectopy: rare PVC  Measurements: .16/.12/.44                Normal Values  Rhythm SR  HR Range    Measurements 0.12-0.20 / 0.06-0.10  / 0.30-0.52

## 2021-03-01 NOTE — ED NOTES
Med rec completed per pt and family   Allergies reviewed    Pt and family states that pt has not taken any of her meds since being home from the hospital besides her pain meds

## 2021-03-01 NOTE — ASSESSMENT & PLAN NOTE
BM today 3/3, continue bowel regimen  Bowel sounds are good  Patient is taking oxycodone for pain after surgery  Senna docusate and MiraLAX scheduled  Advance diet  Enema if needed

## 2021-03-01 NOTE — PROGRESS NOTES
Report called by Cathleen HILARIO . Pt brought up to the floor by staff with belongings in hand. Pt oriented to the room and provided safety education with no further questions at this time.Pt's daughter at bedside. Admit profile and vitals initiated. Safety precautions in place and call light within reach.

## 2021-03-01 NOTE — ASSESSMENT & PLAN NOTE
After surgery with generalized weakness  The patient had some confusing according to the family  Check TSH and vitamin D  PT/OT recommend outpatient services

## 2021-03-01 NOTE — H&P
Hospital Medicine History & Physical Note    Date of Service  3/1/2021    Primary Care Physician  Concepción Madera M.D.    Consultants  None    Code Status  Full Code    Chief Complaint  Chief Complaint   Patient presents with   • Chest Pain     Pt d/c yesterday from hospital, today  returns feeling worse. C/o low RA 2 sat, Chest pressure, nausea, high BP.    • Shortness of Breath     Denies cough.        History of Presenting Illness:    80-year-old female with history of osteoarthritis and chronic kidney disease(fibromuscular dysplasia) with hypertension presented 3/1 with generalized weakness and chest pain, on 2/24 the patient had right total shoulder arthroplasty with no complication, she was discharged after however she came back on 2/27 with hypotension 77/37, patient was admitted for 1 night and her blood pressure was adjusted (reduce Lasix to 20 mg daily and we discontinued ACE inhibitor and continue carvedilol 12.5 mg twice daily), today the patient states she had pressure chest pain when she was sitting, with nausea and vomiting, no palpitation or syncope her pain was not severe, she has never had this pain before, also she has had worsening dyspnea with a dry coughing and no fever, on admission EKG did not show any ischemic changes except a chronic LBBB, and troponin around 20s however BNP was high more than 5000, the patient had crackles and she needed oxygen 3 L nasal cannula, chest x-ray showed, reviewed personally cardiomegaly with possible pulmonary edema, CTA for chest did not show any signs of PE, IV Lasix was started, the patient will be admitted for acute respiratory failure with hypoxia and chest pain rule out.    Since surgery the patient has had constipation and last bowel movement was 2/24, MiraLAX was a scheduled, close monitoring.    The patient lives by herself, she has some help from family, however she feels weakness on all her body with debility, PT and OT was ordered.      Review of  Systems  Review of Systems   Constitutional: Positive for malaise/fatigue. Negative for chills, fever and weight loss.   HENT: Negative.    Respiratory: Positive for shortness of breath. Negative for cough, hemoptysis and sputum production.    Cardiovascular: Positive for chest pain. Negative for palpitations, orthopnea, claudication and leg swelling.   Gastrointestinal: Positive for constipation. Negative for abdominal pain, diarrhea, heartburn, nausea and vomiting.   Genitourinary: Negative.    Musculoskeletal: Negative.    Skin: Negative.    Neurological: Negative.    Psychiatric/Behavioral: Negative.        Past Medical History   has a past medical history of Arthritis (02/10/2021), CATARACT, Fibromuscular dysplasia (HCC) (2018), High cholesterol, Hypertension, Pain, PONV (postoperative nausea and vomiting), Renal disorder (02/10/2021), Unspecified disorder of thyroid, Urinary bladder disorder, and Urinary incontinence (02/10/2021).    Surgical History   has a past surgical history that includes other orthopedic surgery; gyn surgery; craniotomy (1/1/2014); cataract extraction with iol (Bilateral, 2013); other cardiac surgery; and pr reconstr total shoulder implant (Right, 2/24/2021).     Family History  The patient denied any family history of coronary artery disease or heart failure and no history of cancer.    Social History   reports that she has never smoked. She has never used smokeless tobacco. She reports that she does not drink alcohol and does not use drugs.    Allergies  Allergies   Allergen Reactions   • Diltiazem Rash     Increased BP   • Morphine Vomiting   • Pcn [Penicillins] Rash     Rash throughout the body       Medications  Prior to Admission Medications   Prescriptions Last Dose Informant Patient Reported? Taking?   Ascorbic Acid (VITAMIN C PO) > 2 WEEKS AGO at [facility] Family Member Yes No   Sig: Take 1 tablet by mouth every day.   Barberry-Oreg Grape-Goldenseal (BERBERINE COMPLEX PO) > 2 WEEKS  AGO at [facility] Family Member Yes No   Sig: Take 1 tablet by mouth every day.   Cholecalciferol (VITAMIN D3 PO) > 2 WEEKS AGO at [facility] Family Member Yes No   Sig: Take 1 tablet by mouth every day.   Coenzyme Q10 (CO Q10 PO) > 2 WEEKS AGO at [facility] Family Member Yes No   Sig: Take 1 tablet by mouth every day.   DHEA 25 MG Tab > 2 WEEKS AGO at [facility] Family Member Yes No   Sig: Take 25 mg by mouth every day.   NON SPECIFIED UNK at [facility] Family Member Yes No   Sig: Take 1 capsule by mouth every morning. PS-Thyroid 0.5 grain capsule olive oil veggie (compounded)    NON SPECIFIED > 2 WEEKS AGO at [facility] Family Member Yes No   Sig: Take 1 tablet by mouth every day. EpiCor 500 mg daily    Omega 3 1200 MG Cap > 2 WEEKS AGO at [facility] Family Member Yes No   Sig: Take 2 Capsules by mouth every day. With EPA and DHA   acetaminophen (TYLENOL) 500 MG Tab 3/1/2021 at 0600 Family Member Yes Yes   Sig: Take 1,000 mg by mouth every 6 hours as needed.   carvedilol (COREG) 12.5 MG Tab UNK at [facility] Family Member Yes No   Sig: Take 12.5 mg by mouth 2 times a day, with meals.   furosemide (LASIX) 20 MG Tab UNK at [facility] Family Member No No   Sig: Take 1 tablet by mouth every day.   ondansetron (ZOFRAN) 4 MG Tab tablet PRN at [facility] Family Member No No   Sig: Take 1 tablet by mouth every four hours as needed for Nausea/Vomiting for up to 15 days.   oxyCODONE-acetaminophen (PERCOCET) 5-325 MG Tab 3/1/2021 at 0100 Family Member Yes No   Sig: Take 1 tablet by mouth every four hours as needed.   simvastatin (ZOCOR) 20 MG TABS UNK at [facility] Family Member No No   Sig: Take 1 Tab by mouth every evening.   vitamin A 3 mg (69823 units) capsule > 2 WEEKS AGO at [facility] Family Member Yes No   Sig: Take 10,000 Units by mouth see administration instructions. 3 per week       Facility-Administered Medications: None       Physical Exam  Temp:  [36.7 °C (98.1 °F)] 36.7 °C (98.1 °F)  Pulse:  [60-73] 65  Resp:  [14-19] 14  BP: (126-199)/() 187/102  SpO2:  [90 %-100 %] 99 %    Physical  Exam  Constitutional:       Appearance: She is obese. She is not ill-appearing.   HENT:      Head: Normocephalic and atraumatic.   Eyes:      General: No scleral icterus.  Cardiovascular:      Rate and Rhythm: Normal rate.      Heart sounds: No murmur.   Pulmonary:      Effort: Pulmonary effort is normal. No respiratory distress.      Breath sounds: Rales present. No wheezing.   Abdominal:      General: Bowel sounds are normal. There is no distension.      Palpations: Abdomen is soft.      Tenderness: There is no abdominal tenderness. There is no guarding.   Musculoskeletal:         General: Swelling and tenderness present. No deformity or signs of injury.      Right lower leg: No edema.      Left lower leg: No edema.      Comments: Surgery start with the right shoulder  Some swelling   Skin:     Coloration: Skin is not jaundiced or pale.      Findings: No bruising, erythema, lesion or rash.   Neurological:      General: No focal deficit present.      Mental Status: She is alert and oriented to person, place, and time. Mental status is at baseline.      Cranial Nerves: No cranial nerve deficit.      Sensory: No sensory deficit.      Motor: No weakness.         Laboratory:  Recent Labs     02/27/21 1236 02/28/21 0244 03/01/21  1006   WBC 7.2 6.7 5.6   RBC 2.92* 2.69* 2.82*   HEMOGLOBIN 9.1* 8.3* 8.8*   HEMATOCRIT 26.9* 24.8* 26.1*   MCV 92.1 92.2 92.6   MCH 31.2 30.9 31.2   MCHC 33.8 33.5* 33.7   RDW 48.5 47.4 47.4   PLATELETCT 214 191 273   MPV 10.2 10.8 9.8     Recent Labs     02/27/21  1236 02/28/21 0244 03/01/21  1006   SODIUM 129* 130* 134*   POTASSIUM 3.8 3.6 3.9   CHLORIDE 90* 93* 93*   CO2 28 28 31   GLUCOSE 149* 122* 139*   BUN 21 19 10   CREATININE 1.91* 1.24 0.85   CALCIUM 8.6 8.3* 9.0     Recent Labs     02/27/21  1236 02/28/21  0244 03/01/21  1006   ALTSGPT 10  --  10   ASTSGOT 27  --  24   ALKPHOSPHAT 53  --  53   TBILIRUBIN 0.4  --  0.6   GLUCOSE 149* 122* 139*         Recent Labs     02/28/21  0242  03/01/21  1006   NTPROBNP 4151* 5242*         Recent Labs     02/27/21  1236 02/28/21  0244 03/01/21  1006   TROPONINT 45* 27* 20*       Imaging:  CT-CTA CHEST PULMONARY ARTERY W/ RECONS   Final Result         1.  No evidence of pulmonary embolism.   2.  Recent right shoulder surgery. Soft tissue edema about the right shoulder and in the anterior chest.   3.  Right lower lobe atelectasis. No significant consolidation or pleural effusion.   4.  Atherosclerosis.               DX-CHEST-PORTABLE (1 VIEW)   Final Result      Mild cardiomegaly.      NM-CARDIAC STRESS TEST    (Results Pending)   EC-ECHOCARDIOGRAM COMPLETE W/O CONT    (Results Pending)         Assessment/Plan:  I anticipate this patient is appropriate for observation status at this time.    * Acute respiratory failure with hypoxia (HCC)  Assessment & Plan  Came with a crackles and needs oxygen 3 L nasal cannula, her baseline on room air.  Chest x-ray showed possible pulmonary edema with cardiomegaly, BNP very high  Likely pulmonary edema due to heart failure  Start with Lasix, continue Coreg and restart with lisinopril.  Weight daily and I's and O's  Echo is pending and consider cardiology consult if needed  The patient needs work-up for chest pain and stress test was ordered.    Debility  Assessment & Plan  After surgery with generalized weakness  The patient had some confusing according to the family  PT and OT  Check TSH and vitamin D    Pain in the chest  Assessment & Plan  Pressure chest pain with nausea and shortness of breath  EKG chronic LBBB and troponin mildly elevated in 120s  BNP more than 5000  Start with aspirin and switch simvastatin to atorvastatin, highly suspicious of ACS  Echo and stress test  Cardiology consult if needed    HTN (hypertension)- (present on admission)  Assessment & Plan  Came with blood pressure more than 170/90  Continue Coreg 12.5 mg twice daily  We will start with small dose of lisinopril 5 mg daily  Continue IV Lasix  40 mg twice daily  Adjust the medication if needed    CN (constipation)  Assessment & Plan  Since surgery 5 days ago  Bowel sounds are good  Patient is taking oxycodone for pain after surgery  Senna docusate and MiraLAX scheduled  Advance diet  Enema if needed    Elevated TSH  Assessment & Plan  In 2014 her TSH 10  Repeat TSH labs     Fibromuscular dysplasia (HCC)  Assessment & Plan  Complicated with chronic kidney disease  Creatinine 0.8 and GFR more than 50 on admission  Patient received contrast for CTA  Monitor kidney function daily and avoid nephrotoxic medication      The patient is high risk for delirium     Interventions to be considered in all patients in order to minimize the risk of delirium.   -do not disturb patient (vitals or lab draws) between the hours of 10 PM and 6 AM.  -ideally the patient should not sleep during the day and we should avoid day time naps.   -up in chair for meals  -ambulate at least three times daily, as able  -watch for constipation  -timed voiding - ask patient is she would like to go to the bathroom q 2-3 hours, except during the do not disturb hours.   -remove all necessary lines (central lines, peripheral IVs, feeding tubes, cortez catheters)  -unless patient has shown harm to self or others I would recommend against use of restraints - either chemical or physical (antipsychotics)   -minimize polypharmacy, do not dose medication during sleep hours

## 2021-03-01 NOTE — CARE PLAN
Problem: Communication  Goal: The ability to communicate needs accurately and effectively will improve  Outcome: PROGRESSING AS EXPECTED   Pt using call light to alert staff to her needs.    Problem: Safety  Goal: Will remain free from injury  Outcome: PROGRESSING AS EXPECTED  Goal: Will remain free from falls  Outcome: PROGRESSING AS EXPECTED   Safety measures in place.

## 2021-03-01 NOTE — ED TRIAGE NOTES
Chief Complaint   Patient presents with   • Chest Pain     Pt d/c yesterday from hospital, today  returns feeling worse. C/o low RA 2 sat, Chest pressure, nausea, high BP.    • Shortness of Breath     Denies cough.

## 2021-03-01 NOTE — ASSESSMENT & PLAN NOTE
Improving, down to 0-2L, continue to wean  Came with a crackles and needs oxygen 3 L nasal cannula, her baseline on room air.  Chest x-ray showed possible pulmonary edema with cardiomegaly, BNP very high  Echocardiogram normal  Continue PO lasix  Cardiac stress test normal

## 2021-03-01 NOTE — ASSESSMENT & PLAN NOTE
Pressure chest pain with nausea and shortness of breath  EKG chronic LBBB and troponin mildly elevated in 120s  BNP more than 5000  Likely due to pulmonary congestion as patient notes it has improved with lasix treatment  Echocardiogram normal  Stress test normal

## 2021-03-01 NOTE — ED PROVIDER NOTES
CHIEF COMPLAINT  Chest pain, shortness of breath    HPI  Amada Mcduffie is a 80 y.o. female who presents a few days out from right shoulder surgery by Dr. Travis Steele.  She presented on the 27th with low blood pressure and was taken off some of her blood pressure meds.  This morning she woke up having chest pressure and shortness of breath and was noted to have a low oxygen level in the 80s.  The patient does not normally wear oxygen.  She had received a couple of Lovenox injections over the weekend apparently.  She notes persistent nausea and has had difficulty keeping foods down.  No hakan abdominal pain.  No fever.  No taste or smell changes.  Nothing seems to make her symptoms better.  No history of recent cardiac work-up.  Patient does have a history of hypertension and dyslipidemia.  No history of PE.    REVIEW OF SYSTEMS  All other systems are negative.     PAST MEDICAL HISTORY  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/2021    osteo-shouler and knees   • CATARACT     wesley IOL   • Fibromuscular dysplasia (HCC) 2018   • High cholesterol    • Hypertension    • Pain     pain in right shoulder   • PONV (postoperative nausea and vomiting)    • Renal disorder 02/10/2021    related to FMD   • Unspecified disorder of thyroid     hypothyroid   • Urinary bladder disorder     stress incontinence   • Urinary incontinence 02/10/2021       FAMILY HISTORY  No family history on file.    SOCIAL HISTORY  Social History     Socioeconomic History   • Marital status:      Spouse name: Not on file   • Number of children: Not on file   • Years of education: Not on file   • Highest education level: Not on file   Occupational History   • Not on file   Tobacco Use   • Smoking status: Never Smoker   • Smokeless tobacco: Never Used   Substance and Sexual Activity   • Alcohol use: No   • Drug use: No   • Sexual activity: Not on file   Other Topics Concern   • Not on file   Social History Narrative   •  "Not on file     Social Determinants of Health     Financial Resource Strain:    • Difficulty of Paying Living Expenses:    Food Insecurity:    • Worried About Running Out of Food in the Last Year:    • Ran Out of Food in the Last Year:    Transportation Needs:    • Lack of Transportation (Medical):    • Lack of Transportation (Non-Medical):    Physical Activity:    • Days of Exercise per Week:    • Minutes of Exercise per Session:    Stress:    • Feeling of Stress :    Social Connections:    • Frequency of Communication with Friends and Family:    • Frequency of Social Gatherings with Friends and Family:    • Attends Mu-ism Services:    • Active Member of Clubs or Organizations:    • Attends Club or Organization Meetings:    • Marital Status:    Intimate Partner Violence:    • Fear of Current or Ex-Partner:    • Emotionally Abused:    • Physically Abused:    • Sexually Abused:        SURGICAL HISTORY  Past Surgical History:   Procedure Laterality Date   • PB RECONSTR TOTAL SHOULDER IMPLANT Right 2/24/2021    Procedure: ARTHROPLASTY, SHOULDER, TOTAL - REVERSE.;  Surgeon: Rober Lunsford M.D.;  Location: SURGERY SAME DAY [facility];  Service: Orthopedics   • CRANIOTOMY  1/1/2014    Performed by Melanie Pham M.D. 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, aneurysm of renal artery   • OTHER ORTHOPEDIC SURGERY      bilateral TKA       CURRENT MEDICATIONS  Home Medications    **Home medications have not yet been reviewed for this encounter**         ALLERGIES  Allergies   Allergen Reactions   • Diltiazem Rash     Increased BP   • Morphine Vomiting   • Pcn [Penicillins] Rash     Rash throughout the body       PHYSICAL EXAM  VITAL SIGNS: /82   Pulse 73   Temp 36.7 °C (98.1 °F) (Temporal)   Resp 16   Ht 1.626 m (5' 4\")   Wt 66 kg (145 lb 8.1 oz)   SpO2 90%   BMI " 24.98 kg/m²      Constitutional: Well developed, Well nourished, No acute distress, Non-toxic appearance.   HENT: Normocephalic, Atraumatic, TMs normal, mucous membranes moist, no erythema, exudates, swelling, or masses, nares patent  Eyes: nonicteric  Neck: Supple, no meningismus  Lymphatic: No lymphadenopathy noted.   Cardiovascular: Regular rate and rhythm, no gallops rubs or murmurs  Lungs: Clear bilaterally   Abdomen: Bowel sounds normal, Soft, No tenderness, No pulsatile masses.   Skin: Warm, Dry, no rash  Back: No tenderness, No CVA tenderness.   Genitalia: Deferred  Rectal: Deferred  Extremities: Right shoulder wound demonstrates no erythema discharge or other abnormalities, staple line is intact  Neurologic: Alert, appropriate, follows commands, moving all extremities, normal speech   Psychiatric: Affect normal    EKG  Time is 1025, rate 64, sinus rhythm, left bundle branch block, QT interval normal, no ectopy, no change from prior EKG    RADIOLOGY/PROCEDURES  CT-CTA CHEST PULMONARY ARTERY W/ RECONS   Final Result         1.  No evidence of pulmonary embolism.   2.  Recent right shoulder surgery. Soft tissue edema about the right shoulder and in the anterior chest.   3.  Right lower lobe atelectasis. No significant consolidation or pleural effusion.   4.  Atherosclerosis.               DX-CHEST-PORTABLE (1 VIEW)   Final Result      Mild cardiomegaly.        Results for orders placed or performed during the hospital encounter of 03/01/21   CBC WITH DIFFERENTIAL   Result Value Ref Range    WBC 5.6 4.8 - 10.8 K/uL    RBC 2.82 (L) 4.20 - 5.40 M/uL    Hemoglobin 8.8 (L) 12.0 - 16.0 g/dL    Hematocrit 26.1 (L) 37.0 - 47.0 %    MCV 92.6 81.4 - 97.8 fL    MCH 31.2 27.0 - 33.0 pg    MCHC 33.7 33.6 - 35.0 g/dL    RDW 47.4 35.9 - 50.0 fL    Platelet Count 273 164 - 446 K/uL    MPV 9.8 9.0 - 12.9 fL    Neutrophils-Polys 60.20 44.00 - 72.00 %    Lymphocytes 25.70 22.00 - 41.00 %    Monocytes 10.60 0.00 - 13.40 %     Eosinophils 2.90 0.00 - 6.90 %    Basophils 0.20 0.00 - 1.80 %    Immature Granulocytes 0.40 0.00 - 0.90 %    Nucleated RBC 0.00 /100 WBC    Neutrophils (Absolute) 3.35 2.00 - 7.15 K/uL    Lymphs (Absolute) 1.43 1.00 - 4.80 K/uL    Monos (Absolute) 0.59 0.00 - 0.85 K/uL    Eos (Absolute) 0.16 0.00 - 0.51 K/uL    Baso (Absolute) 0.01 0.00 - 0.12 K/uL    Immature Granulocytes (abs) 0.02 0.00 - 0.11 K/uL    NRBC (Absolute) 0.00 K/uL   COMP METABOLIC PANEL   Result Value Ref Range    Sodium 134 (L) 135 - 145 mmol/L    Potassium 3.9 3.6 - 5.5 mmol/L    Chloride 93 (L) 96 - 112 mmol/L    Co2 31 20 - 33 mmol/L    Anion Gap 10.0 7.0 - 16.0    Glucose 139 (H) 65 - 99 mg/dL    Bun 10 8 - 22 mg/dL    Creatinine 0.85 0.50 - 1.40 mg/dL    Calcium 9.0 8.4 - 10.2 mg/dL    AST(SGOT) 24 12 - 45 U/L    ALT(SGPT) 10 2 - 50 U/L    Alkaline Phosphatase 53 30 - 99 U/L    Total Bilirubin 0.6 0.1 - 1.5 mg/dL    Albumin 3.7 3.2 - 4.9 g/dL    Total Protein 6.8 6.0 - 8.2 g/dL    Globulin 3.1 1.9 - 3.5 g/dL    A-G Ratio 1.2 g/dL   TROPONIN   Result Value Ref Range    Troponin T 20 (H) 6 - 19 ng/L   proBrain Natriuretic Peptide, NT   Result Value Ref Range    NT-proBNP 5242 (H) 0 - 125 pg/mL   SARS-CoV-2 PCR (24 hour In-House): Collect NP swab in [facility]    Specimen: Respirate   Result Value Ref Range    SARS-CoV-2 Source NP Swab    LACTIC ACID   Result Value Ref Range    Lactic Acid 1.1 0.5 - 2.0 mmol/L   ESTIMATED GFR   Result Value Ref Range    GFR If African American >60 >60 mL/min/1.73 m 2    GFR If Non African American >60 >60 mL/min/1.73 m 2   EKG (NOW)   Result Value Ref Range    Report       [facility] Emergency Dept.    Test Date:  2021  Pt Name:    MISTY RIVERA             Department: EDS  MRN:        [id number]                      [facility]  Gender:     Female                       Technician: [id number]  :        [id number]                   Requested By:JAYDEN KLEIN  Order #:    [id number]                     Reading MD:    Measurements  Intervals                                Axis  Rate:       64                           P:          51  WA:         160                          QRS:        54  QRSD:       140                          T:          86  QT:         460  QTc:        475    Interpretive Statements  SINUS RHYTHM  IVCD, CONSIDER ATYPICAL LBBB  Compared to ECG 02/27/2021 12:29:26  No significant changes           COURSE & MEDICAL DECISION MAKING  Pertinent Labs & Imaging studies reviewed. (See chart for details)  This is a 80-year-old female who presents with possible fluid overload.  She just had shoulder surgery-a total arthroplasty.  The patient's work-up here demonstrates no evidence of acute MI-troponin is about 20.  However her BNP is markedly elevated.  She also feels short of breath and apparently was hypoxic at home.  There is no evidence of definite Covid radiographically.  There is no evidence of PE.  The patient does report chest pressure and shortness of breath as well as nausea.  She has had difficulty keeping liquids down at home.  She is a heart score of 5.  At this point the patient will be admitted for cardiac work-up/telemetry monitoring.  I am going to diurese her.    FINAL IMPRESSION  1.  Chest pain  2.  Hypoxia  3.         Electronically signed by: Miguel Vegas M.D., 3/1/2021 9:50 AM

## 2021-03-02 ENCOUNTER — APPOINTMENT (OUTPATIENT)
Dept: RADIOLOGY | Facility: MEDICAL CENTER | Age: 81
DRG: 682 | End: 2021-03-02
Attending: INTERNAL MEDICINE
Payer: MEDICARE

## 2021-03-02 LAB
25(OH)D3 SERPL-MCNC: 56 NG/ML (ref 30–100)
ANION GAP SERPL CALC-SCNC: 8 MMOL/L (ref 7–16)
BASOPHILS # BLD AUTO: 0.2 % (ref 0–1.8)
BASOPHILS # BLD: 0.01 K/UL (ref 0–0.12)
BUN SERPL-MCNC: 8 MG/DL (ref 8–22)
CALCIUM SERPL-MCNC: 8.8 MG/DL (ref 8.4–10.2)
CHLORIDE SERPL-SCNC: 95 MMOL/L (ref 96–112)
CHOLEST SERPL-MCNC: 155 MG/DL (ref 100–199)
CO2 SERPL-SCNC: 31 MMOL/L (ref 20–33)
CREAT SERPL-MCNC: 0.88 MG/DL (ref 0.5–1.4)
EKG IMPRESSION: NORMAL
EOSINOPHIL # BLD AUTO: 0.3 K/UL (ref 0–0.51)
EOSINOPHIL NFR BLD: 4.9 % (ref 0–6.9)
ERYTHROCYTE [DISTWIDTH] IN BLOOD BY AUTOMATED COUNT: 48.5 FL (ref 35.9–50)
EST. AVERAGE GLUCOSE BLD GHB EST-MCNC: 114 MG/DL
GLUCOSE SERPL-MCNC: 111 MG/DL (ref 65–99)
HBA1C MFR BLD: 5.6 % (ref 4–5.6)
HCT VFR BLD AUTO: 26.4 % (ref 37–47)
HDLC SERPL-MCNC: 57 MG/DL
HGB BLD-MCNC: 8.7 G/DL (ref 12–16)
IMM GRANULOCYTES # BLD AUTO: 0.02 K/UL (ref 0–0.11)
IMM GRANULOCYTES NFR BLD AUTO: 0.3 % (ref 0–0.9)
LDLC SERPL CALC-MCNC: 67 MG/DL
LYMPHOCYTES # BLD AUTO: 2.62 K/UL (ref 1–4.8)
LYMPHOCYTES NFR BLD: 42.5 % (ref 22–41)
MAGNESIUM SERPL-MCNC: 1.8 MG/DL (ref 1.5–2.5)
MCH RBC QN AUTO: 31.1 PG (ref 27–33)
MCHC RBC AUTO-ENTMCNC: 33 G/DL (ref 33.6–35)
MCV RBC AUTO: 94.3 FL (ref 81.4–97.8)
MONOCYTES # BLD AUTO: 0.8 K/UL (ref 0–0.85)
MONOCYTES NFR BLD AUTO: 13 % (ref 0–13.4)
NEUTROPHILS # BLD AUTO: 2.41 K/UL (ref 2–7.15)
NEUTROPHILS NFR BLD: 39.1 % (ref 44–72)
NRBC # BLD AUTO: 0 K/UL
NRBC BLD-RTO: 0 /100 WBC
PLATELET # BLD AUTO: 280 K/UL (ref 164–446)
PMV BLD AUTO: 9.9 FL (ref 9–12.9)
POTASSIUM SERPL-SCNC: 3.7 MMOL/L (ref 3.6–5.5)
RBC # BLD AUTO: 2.8 M/UL (ref 4.2–5.4)
SODIUM SERPL-SCNC: 134 MMOL/L (ref 135–145)
T4 FREE SERPL-MCNC: 0.87 NG/DL (ref 0.93–1.7)
TRIGL SERPL-MCNC: 154 MG/DL (ref 0–149)
TROPONIN T SERPL-MCNC: 20 NG/L (ref 6–19)
TSH SERPL DL<=0.005 MIU/L-ACNC: 18.16 UIU/ML (ref 0.38–5.33)
WBC # BLD AUTO: 6.2 K/UL (ref 4.8–10.8)

## 2021-03-02 PROCEDURE — 93010 ELECTROCARDIOGRAM REPORT: CPT | Performed by: INTERNAL MEDICINE

## 2021-03-02 PROCEDURE — RXMED WILLOW AMBULATORY MEDICATION CHARGE: Performed by: STUDENT IN AN ORGANIZED HEALTH CARE EDUCATION/TRAINING PROGRAM

## 2021-03-02 PROCEDURE — 80061 LIPID PANEL: CPT

## 2021-03-02 PROCEDURE — 700102 HCHG RX REV CODE 250 W/ 637 OVERRIDE(OP): Performed by: STUDENT IN AN ORGANIZED HEALTH CARE EDUCATION/TRAINING PROGRAM

## 2021-03-02 PROCEDURE — 96372 THER/PROPH/DIAG INJ SC/IM: CPT

## 2021-03-02 PROCEDURE — 84443 ASSAY THYROID STIM HORMONE: CPT

## 2021-03-02 PROCEDURE — 97166 OT EVAL MOD COMPLEX 45 MIN: CPT

## 2021-03-02 PROCEDURE — 700111 HCHG RX REV CODE 636 W/ 250 OVERRIDE (IP)

## 2021-03-02 PROCEDURE — 84484 ASSAY OF TROPONIN QUANT: CPT

## 2021-03-02 PROCEDURE — 85025 COMPLETE CBC W/AUTO DIFF WBC: CPT

## 2021-03-02 PROCEDURE — 700111 HCHG RX REV CODE 636 W/ 250 OVERRIDE (IP): Performed by: STUDENT IN AN ORGANIZED HEALTH CARE EDUCATION/TRAINING PROGRAM

## 2021-03-02 PROCEDURE — 82306 VITAMIN D 25 HYDROXY: CPT

## 2021-03-02 PROCEDURE — 80048 BASIC METABOLIC PNL TOTAL CA: CPT

## 2021-03-02 PROCEDURE — 96375 TX/PRO/DX INJ NEW DRUG ADDON: CPT

## 2021-03-02 PROCEDURE — 770020 HCHG ROOM/CARE - TELE (206)

## 2021-03-02 PROCEDURE — 83036 HEMOGLOBIN GLYCOSYLATED A1C: CPT

## 2021-03-02 PROCEDURE — 97161 PT EVAL LOW COMPLEX 20 MIN: CPT

## 2021-03-02 PROCEDURE — A9270 NON-COVERED ITEM OR SERVICE: HCPCS | Performed by: INTERNAL MEDICINE

## 2021-03-02 PROCEDURE — 78452 HT MUSCLE IMAGE SPECT MULT: CPT | Mod: 26 | Performed by: INTERNAL MEDICINE

## 2021-03-02 PROCEDURE — 700102 HCHG RX REV CODE 250 W/ 637 OVERRIDE(OP): Performed by: INTERNAL MEDICINE

## 2021-03-02 PROCEDURE — 93018 CV STRESS TEST I&R ONLY: CPT | Performed by: INTERNAL MEDICINE

## 2021-03-02 PROCEDURE — A9502 TC99M TETROFOSMIN: HCPCS

## 2021-03-02 PROCEDURE — 84439 ASSAY OF FREE THYROXINE: CPT

## 2021-03-02 PROCEDURE — A9270 NON-COVERED ITEM OR SERVICE: HCPCS | Performed by: STUDENT IN AN ORGANIZED HEALTH CARE EDUCATION/TRAINING PROGRAM

## 2021-03-02 PROCEDURE — 99232 SBSQ HOSP IP/OBS MODERATE 35: CPT | Performed by: STUDENT IN AN ORGANIZED HEALTH CARE EDUCATION/TRAINING PROGRAM

## 2021-03-02 PROCEDURE — 700101 HCHG RX REV CODE 250: Performed by: INTERNAL MEDICINE

## 2021-03-02 PROCEDURE — 96376 TX/PRO/DX INJ SAME DRUG ADON: CPT

## 2021-03-02 PROCEDURE — 700111 HCHG RX REV CODE 636 W/ 250 OVERRIDE (IP): Performed by: INTERNAL MEDICINE

## 2021-03-02 PROCEDURE — 83735 ASSAY OF MAGNESIUM: CPT

## 2021-03-02 RX ORDER — REGADENOSON 0.08 MG/ML
INJECTION, SOLUTION INTRAVENOUS
Status: COMPLETED
Start: 2021-03-02 | End: 2021-03-02

## 2021-03-02 RX ORDER — LEVOTHYROXINE SODIUM 0.03 MG/1
25 TABLET ORAL
Qty: 30 TABLET | Refills: 0 | Status: SHIPPED | OUTPATIENT
Start: 2021-03-03 | End: 2021-04-02

## 2021-03-02 RX ORDER — DIPHENHYDRAMINE HCL 25 MG
25 TABLET ORAL EVERY 8 HOURS PRN
Status: DISCONTINUED | OUTPATIENT
Start: 2021-03-02 | End: 2021-03-04 | Stop reason: HOSPADM

## 2021-03-02 RX ORDER — LISINOPRIL 5 MG/1
5 TABLET ORAL DAILY
Qty: 30 TABLET | Refills: 0 | Status: SHIPPED | OUTPATIENT
Start: 2021-03-03 | End: 2021-04-02

## 2021-03-02 RX ORDER — MORPHINE SULFATE 4 MG/ML
2 INJECTION, SOLUTION INTRAMUSCULAR; INTRAVENOUS EVERY 4 HOURS PRN
Status: DISCONTINUED | OUTPATIENT
Start: 2021-03-02 | End: 2021-03-04 | Stop reason: HOSPADM

## 2021-03-02 RX ORDER — FUROSEMIDE 20 MG/1
20 TABLET ORAL
Status: DISCONTINUED | OUTPATIENT
Start: 2021-03-03 | End: 2021-03-04 | Stop reason: HOSPADM

## 2021-03-02 RX ORDER — LEVOTHYROXINE SODIUM 0.03 MG/1
25 TABLET ORAL
Status: DISCONTINUED | OUTPATIENT
Start: 2021-03-02 | End: 2021-03-04 | Stop reason: HOSPADM

## 2021-03-02 RX ORDER — OXYCODONE HYDROCHLORIDE AND ACETAMINOPHEN 5; 325 MG/1; MG/1
1 TABLET ORAL EVERY 6 HOURS PRN
Status: DISCONTINUED | OUTPATIENT
Start: 2021-03-02 | End: 2021-03-03

## 2021-03-02 RX ORDER — LORAZEPAM 2 MG/ML
0.5 INJECTION INTRAMUSCULAR EVERY 4 HOURS PRN
Status: DISCONTINUED | OUTPATIENT
Start: 2021-03-02 | End: 2021-03-03

## 2021-03-02 RX ORDER — ONDANSETRON 4 MG/1
4 TABLET, ORALLY DISINTEGRATING ORAL EVERY 4 HOURS PRN
Status: DISCONTINUED | OUTPATIENT
Start: 2021-03-02 | End: 2021-03-04 | Stop reason: HOSPADM

## 2021-03-02 RX ORDER — FUROSEMIDE 20 MG/1
20 TABLET ORAL DAILY
Qty: 30 TABLET | Refills: 0 | Status: SHIPPED | OUTPATIENT
Start: 2021-03-03 | End: 2021-04-02

## 2021-03-02 RX ADMIN — SENNOSIDES AND DOCUSATE SODIUM 2 TABLET: 8.6; 5 TABLET ORAL at 05:41

## 2021-03-02 RX ADMIN — ATORVASTATIN CALCIUM 20 MG: 20 TABLET, FILM COATED ORAL at 18:13

## 2021-03-02 RX ADMIN — OXYCODONE HYDROCHLORIDE AND ACETAMINOPHEN 1 TABLET: 5; 325 TABLET ORAL at 14:20

## 2021-03-02 RX ADMIN — SENNOSIDES AND DOCUSATE SODIUM 2 TABLET: 8.6; 5 TABLET ORAL at 18:13

## 2021-03-02 RX ADMIN — SODIUM PHOSPHATE 133 ML: 7; 19 ENEMA RECTAL at 08:57

## 2021-03-02 RX ADMIN — REGADENOSON 0.4 MG: 0.08 INJECTION, SOLUTION INTRAVENOUS at 12:03

## 2021-03-02 RX ADMIN — MAGNESIUM HYDROXIDE 30 ML: 400 SUSPENSION ORAL at 10:35

## 2021-03-02 RX ADMIN — ASPIRIN 81 MG: 81 TABLET, COATED ORAL at 05:41

## 2021-03-02 RX ADMIN — ENOXAPARIN SODIUM 40 MG: 40 INJECTION SUBCUTANEOUS at 05:40

## 2021-03-02 RX ADMIN — ONDANSETRON HYDROCHLORIDE 4 MG: 2 SOLUTION INTRAMUSCULAR; INTRAVENOUS at 08:57

## 2021-03-02 RX ADMIN — LEVOTHYROXINE SODIUM 25 MCG: 25 TABLET ORAL at 10:35

## 2021-03-02 RX ADMIN — ONDANSETRON HYDROCHLORIDE 4 MG: 2 SOLUTION INTRAMUSCULAR; INTRAVENOUS at 18:10

## 2021-03-02 RX ADMIN — POLYETHYLENE GLYCOL 3350 1 PACKET: 17 POWDER, FOR SOLUTION ORAL at 18:12

## 2021-03-02 RX ADMIN — POLYETHYLENE GLYCOL 3350 1 PACKET: 17 POWDER, FOR SOLUTION ORAL at 05:41

## 2021-03-02 RX ADMIN — LISINOPRIL 5 MG: 5 TABLET ORAL at 05:41

## 2021-03-02 RX ADMIN — ONDANSETRON HYDROCHLORIDE 4 MG: 2 SOLUTION INTRAMUSCULAR; INTRAVENOUS at 14:20

## 2021-03-02 RX ADMIN — FUROSEMIDE 40 MG: 10 INJECTION, SOLUTION INTRAMUSCULAR; INTRAVENOUS at 05:40

## 2021-03-02 RX ADMIN — POLYETHYLENE GLYCOL 3350 1 PACKET: 17 POWDER, FOR SOLUTION ORAL at 12:45

## 2021-03-02 RX ADMIN — MORPHINE SULFATE 2 MG: 4 INJECTION INTRAVENOUS at 20:46

## 2021-03-02 RX ADMIN — OXYCODONE HYDROCHLORIDE AND ACETAMINOPHEN 1 TABLET: 5; 325 TABLET ORAL at 05:39

## 2021-03-02 RX ADMIN — CARVEDILOL 12.5 MG: 6.25 TABLET, FILM COATED ORAL at 18:13

## 2021-03-02 ASSESSMENT — ENCOUNTER SYMPTOMS
DIZZINESS: 0
VOMITING: 0
EYE PAIN: 0
PALPITATIONS: 0
DIARRHEA: 0
FOCAL WEAKNESS: 0
CHILLS: 0
NAUSEA: 1
INSOMNIA: 0
BLURRED VISION: 0
SENSORY CHANGE: 0
ABDOMINAL PAIN: 0
HEADACHES: 0
FEVER: 0
COUGH: 0
BACK PAIN: 0
SHORTNESS OF BREATH: 0
CONSTIPATION: 0

## 2021-03-02 ASSESSMENT — GAIT ASSESSMENTS
GAIT LEVEL OF ASSIST: SUPERVISED
DISTANCE (FEET): 150
DEVIATION: NO DEVIATION

## 2021-03-02 ASSESSMENT — COGNITIVE AND FUNCTIONAL STATUS - GENERAL
MOVING TO AND FROM BED TO CHAIR: A LITTLE
SUGGESTED CMS G CODE MODIFIER DAILY ACTIVITY: CJ
TOILETING: A LITTLE
MOBILITY SCORE: 18
STANDING UP FROM CHAIR USING ARMS: A LITTLE
DRESSING REGULAR LOWER BODY CLOTHING: A LITTLE
HELP NEEDED FOR BATHING: A LITTLE
SUGGESTED CMS G CODE MODIFIER MOBILITY: CK
WALKING IN HOSPITAL ROOM: A LITTLE
DRESSING REGULAR UPPER BODY CLOTHING: A LITTLE
MOVING FROM LYING ON BACK TO SITTING ON SIDE OF FLAT BED: A LITTLE
DAILY ACTIVITIY SCORE: 20
CLIMB 3 TO 5 STEPS WITH RAILING: A LITTLE
TURNING FROM BACK TO SIDE WHILE IN FLAT BAD: A LITTLE

## 2021-03-02 ASSESSMENT — PAIN DESCRIPTION - PAIN TYPE: TYPE: ACUTE PAIN

## 2021-03-02 ASSESSMENT — ACTIVITIES OF DAILY LIVING (ADL): TOILETING: INDEPENDENT

## 2021-03-02 ASSESSMENT — FIBROSIS 4 INDEX: FIB4 SCORE: 2.14

## 2021-03-02 ASSESSMENT — LIFESTYLE VARIABLES: SUBSTANCE_ABUSE: 0

## 2021-03-02 NOTE — THERAPY
Occupational Therapy   Initial Evaluation     Patient Name: Amada Mcduffie  Age:  80 y.o., Sex:  female  Medical Record #: [id number]  Today's Date: 3/2/2021     Precautions  Precautions: Sling Right Upper Extremity, Non Weight Bearing Right Upper Extremity    Assessment  Patient is 80 y.o. female with a diagnosis of readmit after recent TSA for chest pain, nausea, constipation.  Additional factors influencing patient status / progress: good family support available at home.      Plan    Recommend Occupational Therapy for Evaluation only for the following treatments:  NA.    DC Equipment Recommendations: (P) None  Discharge Recommendations: (P) Recommend outpatient occupational therapy services to address higher level deficits     Subjective    Pleasant and cooperative throughout. Good understanding of post op precautions     Objective       03/02/21 1101   Total Time Spent   Total Time Spent (Mins) 40   Charge Group   OT Evaluation OT Evaluation Mod   Initial Contact Note    Initial Contact Note Order Received and Verified, Evaluation Only - Patient Does Not Require Further Acute Occupational Therapy at this Time.  However, May Benefit from Post Acute Therapy for Higher Level Functional Deficits.   Prior Living Situation   Prior Services None   Housing / Facility 1 Story House   Steps Into Home 1   Steps In Home 0   Lives with - Patient's Self Care Capacity Alone and Able to Care For Self   Comments currently staying with daughter, who can assist as needed   Prior Level of ADL Function   Self Feeding Independent   Grooming / Hygiene Independent   Bathing Independent   Dressing Independent   Toileting Independent   Prior Level of IADL Function   Medication Management Independent   Laundry Requires Assist   Kitchen Mobility Independent   Finances Independent   Home Management Requires Assist   Shopping Requires Assist   Prior Level Of Mobility Independent Without Device in Home   History of Falls   History of  Post-Op Assessment Note      CV Status:  Stable    Mental Status:  Alert and awake    Hydration Status:  Stable    PONV Controlled:  None    Airway Patency:  Patent    Post Op Vitals Reviewed: Yes          Staff: CRNA       Comments: vss-report to ICU team          BP      Temp      Pulse     Resp      SpO2 Falls No   Precautions   Precautions Sling Right Upper Extremity;No Resistive Exercise or Activity with Right Upper Extremity ;Non Weight Bearing Right Upper Extremity   Vitals   O2 Delivery Device None - Room Air   Pain 0 - 10 Group   Therapist Pain Assessment Post Activity Pain Same as Prior to Activity;Nurse Notified;0   Cognition    Cognition / Consciousness WDL   Level of Consciousness Alert   Passive ROM Upper Body   Passive ROM Upper Body X   Comments left UE WNL, right UE NT, hand WFL   Active ROM Upper Body   Active ROM Upper Body  X   Dominant Hand Right   Comments see above note   Strength Upper Body   Upper Body Strength  X   Comments R shoulder in sling, otherwise WFL   Sensation Upper Body   Upper Extremity Sensation  WDL   Upper Body Muscle Tone   Upper Body Muscle Tone  WDL   Coordination Upper Body   Coordination X   Comments due to right UE in sling   Balance Assessment   Sitting Balance (Static) Good   Sitting Balance (Dynamic) Fair +   Standing Balance (Static) Good   Standing Balance (Dynamic) Fair +   Weight Shift Sitting Good   Weight Shift Standing Good   Bed Mobility    Supine to Sit Supervised   Sit to Supine   (left seated in bedside chair)   Scooting Supervised   ADL Assessment   Eating Supervision   Grooming Supervision;Standing   Bathing   (NT)   Upper Body Dressing Minimal Assist   Lower Body Dressing Minimal Assist   Toileting Supervision   How much help from another person does the patient currently need...   Putting on and taking off regular lower body clothing? 3   Bathing (including washing, rinsing, and drying)? 3   Toileting, which includes using a toilet, bedpan, or urinal? 3   Putting on and taking off regular upper body clothing? 3   Taking care of personal grooming such as brushing teeth? 4   Eating meals? 4   6 Clicks Daily Activity Score 20   Functional Mobility   Sit to Stand Supervised   Bed, Chair, Wheelchair Transfer Supervised   Toilet Transfers Supervised   Transfer  Method Stand Pivot   Visual Perception   Visual Perception  WDL   Activity Tolerance   Sitting in Chair ad adore   Sitting Edge of Bed ad adore   Standing ad adore   Education Group   Education Provided Weight Bearing Precautions   Role of Occupational Therapist Patient Response Patient;Acceptance;Explanation;Demonstration;Verbal Demonstration;Action Demonstration;Family   Weight Bearing Precautions Patient Response Patient;Family;Acceptance;Explanation;Demonstration;Verbal Demonstration;Action Demonstration   Anticipated Discharge Equipment and Recommendations   DC Equipment Recommendations None   Discharge Recommendations Recommend outpatient occupational therapy services to address higher level deficits   Interdisciplinary Plan of Care Collaboration   IDT Collaboration with  Nursing;Family / Caregiver   Patient Position at End of Therapy Seated;Call Light within Reach;Tray Table within Reach;Phone within Reach;Family / Friend in Room   Collaboration Comments report given   Session Information   Date / Session Number  3/2,1/1   Priority 0

## 2021-03-02 NOTE — PROCEDURES
Nursing care plan includes knowledge deficit, potential for discomfort, potential for compromised cardiac output. POC includes teaching, comfort measures and reassurance, and access to code cart, cardiology stand by and availability of rapid response team. Pt verbalizes good understanding of benefits and risks of pharmacological cardiac stress test. Informed consent obtained. Baseline EKG shows LBBB. Lexiscan given, pt developed cough, SOB, and nausea with emesis and a headache in recovery phase. VS stable,  symptoms resolved. To waiting room, caffeinated fluids and/or snacks given. Nursing goals met.

## 2021-03-02 NOTE — PROGRESS NOTES
2 RN Skin check completed.  Devices in place; Tele box, PIV x1, sling  Skin assessed under devices: intact  Confirmed pressure ulcers found on: NONE  New potential pressure ulcers noted on: NONE  Wound consult placed: nONE  The following interventions in place: Pillows for support and positioning.    Pt has R shoulder incision from sx PTA. Incision is covered with gauze and Tegaderm and is clean dry and intact and in a sling. Pt's skin is other marie WDL.

## 2021-03-02 NOTE — PROGRESS NOTES
After applying arm immobilizer PT requested a stable sling so stab;e sling was ordered and placed onto PT.

## 2021-03-02 NOTE — PROGRESS NOTES
- General Info


Date of Service: 05/19/17





- Patient Data


Vitals - most recent: 


 Last Vital Signs











Temp  98.8 F   05/04/17 07:48


 


Pulse  60   05/04/17 07:48


 


Resp  20   05/04/17 07:48


 


BP  132/54 L  05/04/17 07:48


 


Pulse Ox  100   05/04/17 07:48











Weight - most recent: 98.928 kg


Med Orders - Current: 


 Current Medications








Discontinued Medications





Acetaminophen (Tylenol)  650 mg PO Q4H PRN


   PRN Reason: Pain (Mild 1-3)/fever


Hydrocodone Bitart/Acetaminophen (Norco 325-5 Mg)  1 tab PO Q4H PRN


   PRN Reason: Pain (moderate 4-6)


   Last Admin: 05/04/17 12:15 Dose:  1 tab


Albuterol/Ipratropium (Duoneb 3.0-0.5 Mg/3 Ml)  3 ml NEB Q4H PRN


   PRN Reason: Shortness Of Breath/wheezing


Bisacodyl (Dulcolax)  5 mg PO DAILY PRN


   PRN Reason: Constipation


Cyclobenzaprine HCl (Flexeril)  5 mg PO DAILY PRN


   PRN Reason: Pain


Diatrizoate Meglum/Diatrizoate Sod (Gastrografin 37%)  90 ml PO ONETIME ONE


   Stop: 05/03/17 14:03


   Last Admin: 05/03/17 14:31 Dose:  90 ml


Hydromorphone HCl (Dilaudid)  0.5 mg IVPUSH ONETIME ONE


   Stop: 05/03/17 04:08


   Last Admin: 05/03/17 04:37 Dose:  0.5 mg


Hydromorphone HCl (Dilaudid)  0.5 mg IVPUSH ONETIME ONE


   Stop: 05/03/17 06:14


   Last Admin: 05/03/17 06:19 Dose:  0.5 mg


Hydromorphone HCl (Dilaudid)  0.5 mg IVPUSH ONETIME ONE


   Stop: 05/03/17 07:41


   Last Admin: 05/03/17 07:44 Dose:  0.5 mg


Hydromorphone HCl (Dilaudid)  1 mg IVPUSH Q4H PRN


   PRN Reason: Pain


   Last Admin: 05/04/17 09:06 Dose:  1 mg


Sodium Chloride (Normal Saline)  1,000 mls @ 150 mls/hr IV ASDIRECTED [facility]


   Last Admin: 05/04/17 08:14 Dose:  150 mls/hr


Sodium Chloride (Normal Saline)  500 mls @ 999 mls/hr IV .BOLUS ONE


   Stop: 05/03/17 06:14


   Last Admin: 05/03/17 06:45 Dose:  999 mls/hr


Sodium Chloride (Normal Saline)  500 mls @ 999 mls/hr IV .BOLUS ONE


   Stop: 05/03/17 06:27


   Last Admin: 05/03/17 07:56 Dose:  Not Given


Potassium Chloride 10 meq/ (Premix)  100 mls @ 50 mls/hr IV ASDIRECTED ONE


   Stop: 05/03/17 09:27


   Last Admin: 05/03/17 07:37 Dose:  50 mls/hr


Sodium Chloride (Normal Saline)  1,000 mls @ 150 mls/hr IV ASDIRECTED [facility]


Promethazine HCl 12.5 mg/ (Sodium Chloride)  50.5 mls @ 100 mls/hr IV Q6H PRN


   PRN Reason: Nausea/Vomiting


Iopamidol (Isovue-300 (61%))  150 ml IVPUSH ONETIME ONE


   Stop: 05/03/17 14:03


   Last Admin: 05/03/17 14:31 Dose:  110 ml


Lorazepam (Ativan)  1 mg IV Q6H PRN


   PRN Reason: Nausea/Vomiting


Miscellaneous Information (Remove Patch)  0 ea TRDERM DAILY [facility]


   Last Admin: 05/04/17 08:14 Dose:  Not Given


Nicotine (Habitrol)  7 mg TRDERM DAILY [facility]


   Last Admin: 05/04/17 08:14 Dose:  Not Given


Ondansetron HCl (Zofran)  4 mg IVPUSH ONETIME ONE


   Stop: 05/03/17 04:08


   Last Admin: 05/03/17 04:35 Dose:  4 mg


Ondansetron HCl (Zofran)  4 mg IV Q6H PRN


   PRN Reason: Nausea/Vomiting


   Last Admin: 05/03/17 14:20 Dose:  4 mg


Polyethylene Glycol (Miralax)  17 gm PO DAILY PRN


   PRN Reason: Constipation


Senna/Docusate Sodium (Senna Plus)  1 tab PO BID PRN


   PRN Reason: Constipation


Sodium Chloride (Saline Flush)  10 ml FLUSH ASDIRECTED PRN


   PRN Reason: Keep Vein Open


   Last Admin: 05/03/17 04:37 Dose:  10 ml


Sodium Chloride (Saline Flush)  10 ml FLUSH ONETIME PRN


   PRN Reason: IV FLUSH


   Last Admin: 05/03/17 14:31 Dose:  10 ml


Temazepam (Restoril)  30 mg PO BEDTIME PRN


   PRN Reason: Sleep











Consult PN Assessment/Plan


Procedures: 


Procedures





ASSAY OF MAGNESIUM (05/03/17)


C-REACTIVE PROTEIN (05/03/17)


COMPLETE CBC W/AUTO DIFF WBC (05/03/17)


COMPREHEN METABOLIC PANEL (05/03/17)


CT ABD & PELV W/CONTRAST (05/03/17)


CT ABD & PELVIS W/O CONTRAST (05/03/17)


EMERGENCY DEPT VISIT (05/03/17)


HYDRATE IV INFUSION ADD-ON (05/03/17)


METABOLIC PANEL TOTAL CA (05/03/17)


REMOVAL OF BREAST LESION (07/12/16)


ROUTINE VENIPUNCTURE (05/03/17)


THER/PROPH/DIAG IV INF INIT (05/03/17)


TX/PRO/DX INJ NEW DRUG ADDON (05/03/17)


TX/PRO/DX INJ SAME DRUG ADON (05/03/17)


URINALYSIS AUTO W/SCOPE (05/03/17)


URINE CULTURE/COLONY COUNT (07/25/16)








Problem List Initiated/Reviewed/Updated: Yes


My Orders last 24 hours: 





discharge dictated [facility] Medicine Daily Progress Note    Date of Service  3/2/2021    Chief Complaint  80 y.o. female admitted 3/1/2021 with chest pain, nausea    Hospital Course  80-year-old female with history of osteoarthritis and chronic kidney disease(fibromuscular dysplasia) with hypertension presented 3/1 with generalized weakness and chest pain, on 2/24 the patient had right total shoulder arthroplasty with no complication, she was discharged after however she came back on 2/27 with hypotension 77/37, patient was admitted for 1 night and her blood pressure was adjusted (reduce Lasix to 20 mg daily and we discontinued ACE inhibitor and continue carvedilol 12.5 mg twice daily), today the patient states she had pressure chest pain when she was sitting, with nausea and vomiting, no palpitation or syncope her pain was not severe, she has never had this pain before, also she has had worsening dyspnea with a dry coughing and no fever, on admission EKG did not show any ischemic changes except a chronic LBBB, and troponin around 20s however BNP was high more than 5000, the patient had crackles and she needed oxygen 3 L nasal cannula, chest x-ray showed, reviewed personally cardiomegaly with possible pulmonary edema, CTA for chest did not show any signs of PE, IV Lasix was started, the patient will be admitted for acute respiratory failure with hypoxia and chest pain rule out.     Since surgery the patient has had constipation and last bowel movement was 2/24, MiraLAX was a scheduled, close monitoring.    Interval Problem Update  Admitted yesterday for chest pain, nausea.  Stress test today negative for reversible ischemia.  Echocardiogram normal.  Now on room air, requiring 3L oxygen yesterday.  Lungs with minor crackles, no LE edema.  Will transition to PO lasix starting tomorrow.  Blood pressure improving since admit. Continue coreg and lisinopril.  Sling ordered for right arm given hx recent surgery, current one too small.  PT/OT  recommend outpatient services on discharge.  Free T4 low, TSH elevated, will start patient on 25 mcg synthroid.  Last BM last week per pt, will continue aggressive bowel regimen.    Consultants/Specialty  none    Code Status  Full Code    Disposition  Inpatient, anticipate discharge home tomorrow morning if remains clinically stable. Plan for home oxygen eval tomorrow morning 3/3.    Review of Systems  Review of Systems   Constitutional: Negative for chills and fever.   Eyes: Negative for blurred vision and pain.   Respiratory: Negative for cough and shortness of breath.    Cardiovascular: Negative for chest pain, palpitations and leg swelling.   Gastrointestinal: Positive for nausea. Negative for abdominal pain, constipation, diarrhea and vomiting.   Genitourinary: Negative for dysuria and urgency.   Musculoskeletal: Negative for back pain.   Skin: Negative for itching and rash.   Neurological: Negative for dizziness, sensory change, focal weakness and headaches.   Psychiatric/Behavioral: Negative for substance abuse. The patient does not have insomnia.         Physical Exam  Temp:  [36.3 °C (97.3 °F)-36.9 °C (98.4 °F)] 36.9 °C (98.4 °F)  Pulse:  [59-72] 59  Resp:  [17-18] 18  BP: (130-156)/(59-88) 146/74  SpO2:  [90 %-100 %] 98 %    Physical Exam  Vitals reviewed.   Constitutional:       General: She is not in acute distress.     Appearance: She is not diaphoretic.   HENT:      Head: Normocephalic and atraumatic.      Right Ear: External ear normal.      Left Ear: External ear normal.      Nose: Nose normal. No congestion.      Mouth/Throat:      Pharynx: No oropharyngeal exudate or posterior oropharyngeal erythema.   Eyes:      Extraocular Movements: Extraocular movements intact.      Pupils: Pupils are equal, round, and reactive to light.   Cardiovascular:      Rate and Rhythm: Normal rate and regular rhythm.   Pulmonary:      Effort: Pulmonary effort is normal. No respiratory distress.      Breath sounds: Rales  present. No wheezing.   Abdominal:      General: Bowel sounds are normal. There is no distension.      Palpations: Abdomen is soft.      Tenderness: There is no abdominal tenderness.   Musculoskeletal:         General: No swelling. Normal range of motion.      Cervical back: Normal range of motion and neck supple.   Skin:     General: Skin is warm and dry.   Neurological:      General: No focal deficit present.      Mental Status: She is alert and oriented to person, place, and time.      Sensory: No sensory deficit.      Motor: No weakness.   Psychiatric:         Mood and Affect: Mood normal.         Behavior: Behavior normal.         Fluids  No intake or output data in the 24 hours ending 03/02/21 1534    Laboratory  Recent Labs     02/28/21  0244 03/01/21  1006 03/02/21  0157   WBC 6.7 5.6 6.2   RBC 2.69* 2.82* 2.80*   HEMOGLOBIN 8.3* 8.8* 8.7*   HEMATOCRIT 24.8* 26.1* 26.4*   MCV 92.2 92.6 94.3   MCH 30.9 31.2 31.1   MCHC 33.5* 33.7 33.0*   RDW 47.4 47.4 48.5   PLATELETCT 191 273 280   MPV 10.8 9.8 9.9     Recent Labs     02/28/21  0244 03/01/21  1006 03/02/21  0157   SODIUM 130* 134* 134*   POTASSIUM 3.6 3.9 3.7   CHLORIDE 93* 93* 95*   CO2 28 31 31   GLUCOSE 122* 139* 111*   BUN 19 10 8   CREATININE 1.24 0.85 0.88   CALCIUM 8.3* 9.0 8.8             Recent Labs     03/02/21  0157   TRIGLYCERIDE 154*   HDL 57   LDL 67       Imaging  NM-CARDIAC STRESS TEST   Final Result      EC-ECHOCARDIOGRAM COMPLETE W/ CONT   Final Result      CT-CTA CHEST PULMONARY ARTERY W/ RECONS   Final Result         1.  No evidence of pulmonary embolism.   2.  Recent right shoulder surgery. Soft tissue edema about the right shoulder and in the anterior chest.   3.  Right lower lobe atelectasis. No significant consolidation or pleural effusion.   4.  Atherosclerosis.               DX-CHEST-PORTABLE (1 VIEW)   Final Result      Mild cardiomegaly.           Assessment/Plan  * Acute respiratory failure with hypoxia (HCC)  Assessment &  Plan  Came with a crackles and needs oxygen 3 L nasal cannula, her baseline on room air.  Chest x-ray showed possible pulmonary edema with cardiomegaly, BNP very high  Echocardiogram normal  Lung exam much improved after IV lasix  Transition to PO lasix to start tomorrow 3/3  Cardiac stress test normal    Debility  Assessment & Plan  After surgery with generalized weakness  The patient had some confusing according to the family  Check TSH and vitamin D  PT/OT recommend outpatient services    Pain in the chest  Assessment & Plan  Pressure chest pain with nausea and shortness of breath  EKG chronic LBBB and troponin mildly elevated in 120s  BNP more than 5000  Likely due to pulmonary congestion as patient notes it has improved with lasix treatment  Echocardiogram normal  Stress test normal    HTN (hypertension)- (present on admission)  Assessment & Plan  improved  Came with blood pressure more than 170/90  Continue Coreg 12.5 mg twice daily  We will start with small dose of lisinopril 5 mg daily  Start lasix 20 mg PO daily, first dose tomorrow 3/3  Adjust the medication if needed    CN (constipation)  Assessment & Plan  Since surgery 5 days ago  Bowel sounds are good  Patient is taking oxycodone for pain after surgery  Senna docusate and MiraLAX scheduled  Advance diet  Enema if needed    Elevated TSH  Assessment & Plan  In 2014 her TSH 10  Repeat TSH labs     Fibromuscular dysplasia (HCC)  Assessment & Plan  Complicated with chronic kidney disease  Creatinine 0.8 and GFR more than 50 on admission  Patient received contrast for CTA  Monitor kidney function daily and avoid nephrotoxic medication       VTE prophylaxis: lovenox

## 2021-03-02 NOTE — PROGRESS NOTES
received report from SULAIMAN Salas. RN reviewed previous assessment and agree with what is charted. Patient is laying in bed with ice on her shoulder, talked to patient about pain management and getting PRN percocet changed to Q 6 instead of Q 8. MD will change order.

## 2021-03-02 NOTE — PROGRESS NOTES
12:30 patient back on unit from stress test.     Patient taken off O2. Saturation is 96%-98% on RA, patient c/o of a HA but would like to take something after she finishes lunch.

## 2021-03-02 NOTE — PROGRESS NOTES
Report received from Ayse HILARIO. Pt sitting up in bed at the time of report. Plan of care assumed. Safety precautions in place and call light within reach.

## 2021-03-02 NOTE — THERAPY
Physical Therapy   Initial Evaluation     Patient Name: Amada Mcduffie  Age:  80 y.o., Sex:  female  Medical Record #: [id number]  Today's Date: 3/2/2021     Precautions: Sling Right Upper Extremity, Non Weight Bearing Right Upper Extremity    Assessment  Patient is 80 y.o. female with a diagnosis of acute respiratory failure with hypoxemia and chest pain.  Pt has a R reverse TSA on 2/24/21, is currently staying with her dtr during her recovery. Functionally, pt is able to mobilize safely without an AD, steady with no LOB. Pt denies feeling weak and states feels she can manage at dtrs home with her assist as needed. No further acute PT needs. DC PT.         Plan    Recommend Physical Therapy for Evaluation only     DC Equipment Recommendations: Recommend pt have a new R shoulder sling prior to DC home- current one is too small  Discharge Recommendations: Recommend outpatient physical therapy services to address higher level deficits        03/02/21 1145   Prior Living Situation   Housing / Facility [address] [facility]   Lives with - Patient's Self Care Capacity Alone and Able to Care For Self   Comments Pt currently is staying with dtr while she is recovering from R reverse TSA.    Prior Level of Functional Mobility   Bed Mobility Independent   Transfer Status Independent   Ambulation Independent   Assistive Devices Used None   Stairs Independent   Comments Pt had a R reverse TSA 2/24/21.   Cognition    Level of Consciousness Alert   Comments Oriented x4   Strength Upper Body   Comments R shoulder in sling   Gait Analysis   Gait Level Of Assist Supervised   Assistive Device None   Distance (Feet) 150   # of Times Distance was Traveled 1   Deviation No deviation   Bed Mobility    Supine to Sit Supervised   Functional Mobility   Sit to Stand Supervised   Bed, Chair, Wheelchair Transfer Supervised

## 2021-03-02 NOTE — PROGRESS NOTES
Telemetry Shift Summary    RHYTHM:SR  HR RANGE:66  ECTOPY:NONE  MEASUREMENTS:0.16/0.12/0.42    Normal Measurements  Rhythm SR  HR Range:   Measurements: 0.12-0.20/0.06-0.10/0.30-0.52    Tele strips reviewed and placed in chart.

## 2021-03-03 ENCOUNTER — PHARMACY VISIT (OUTPATIENT)
Dept: PHARMACY | Facility: MEDICAL CENTER | Age: 81
End: 2021-03-03
Payer: COMMERCIAL

## 2021-03-03 LAB
ANION GAP SERPL CALC-SCNC: 10 MMOL/L (ref 7–16)
BUN SERPL-MCNC: 8 MG/DL (ref 8–22)
CALCIUM SERPL-MCNC: 8.8 MG/DL (ref 8.4–10.2)
CHLORIDE SERPL-SCNC: 90 MMOL/L (ref 96–112)
CO2 SERPL-SCNC: 31 MMOL/L (ref 20–33)
CREAT SERPL-MCNC: 0.9 MG/DL (ref 0.5–1.4)
GLUCOSE SERPL-MCNC: 138 MG/DL (ref 65–99)
POTASSIUM SERPL-SCNC: 3.3 MMOL/L (ref 3.6–5.5)
SODIUM SERPL-SCNC: 131 MMOL/L (ref 135–145)

## 2021-03-03 PROCEDURE — 700111 HCHG RX REV CODE 636 W/ 250 OVERRIDE (IP): Performed by: INTERNAL MEDICINE

## 2021-03-03 PROCEDURE — 700102 HCHG RX REV CODE 250 W/ 637 OVERRIDE(OP): Performed by: INTERNAL MEDICINE

## 2021-03-03 PROCEDURE — 80048 BASIC METABOLIC PNL TOTAL CA: CPT

## 2021-03-03 PROCEDURE — 700111 HCHG RX REV CODE 636 W/ 250 OVERRIDE (IP): Performed by: STUDENT IN AN ORGANIZED HEALTH CARE EDUCATION/TRAINING PROGRAM

## 2021-03-03 PROCEDURE — 700102 HCHG RX REV CODE 250 W/ 637 OVERRIDE(OP): Performed by: STUDENT IN AN ORGANIZED HEALTH CARE EDUCATION/TRAINING PROGRAM

## 2021-03-03 PROCEDURE — A9270 NON-COVERED ITEM OR SERVICE: HCPCS | Performed by: INTERNAL MEDICINE

## 2021-03-03 PROCEDURE — 99233 SBSQ HOSP IP/OBS HIGH 50: CPT | Performed by: STUDENT IN AN ORGANIZED HEALTH CARE EDUCATION/TRAINING PROGRAM

## 2021-03-03 PROCEDURE — A9270 NON-COVERED ITEM OR SERVICE: HCPCS | Performed by: STUDENT IN AN ORGANIZED HEALTH CARE EDUCATION/TRAINING PROGRAM

## 2021-03-03 PROCEDURE — 700101 HCHG RX REV CODE 250: Performed by: INTERNAL MEDICINE

## 2021-03-03 PROCEDURE — 770020 HCHG ROOM/CARE - TELE (206)

## 2021-03-03 RX ORDER — POTASSIUM CHLORIDE 20 MEQ/1
40 TABLET, EXTENDED RELEASE ORAL ONCE
Status: COMPLETED | OUTPATIENT
Start: 2021-03-03 | End: 2021-03-03

## 2021-03-03 RX ORDER — KETOROLAC TROMETHAMINE 30 MG/ML
15 INJECTION, SOLUTION INTRAMUSCULAR; INTRAVENOUS EVERY 6 HOURS PRN
Status: DISCONTINUED | OUTPATIENT
Start: 2021-03-03 | End: 2021-03-04 | Stop reason: HOSPADM

## 2021-03-03 RX ORDER — PROCHLORPERAZINE EDISYLATE 5 MG/ML
10 INJECTION INTRAMUSCULAR; INTRAVENOUS EVERY 6 HOURS PRN
Status: DISCONTINUED | OUTPATIENT
Start: 2021-03-03 | End: 2021-03-04 | Stop reason: HOSPADM

## 2021-03-03 RX ORDER — TRAMADOL HYDROCHLORIDE 50 MG/1
50 TABLET ORAL EVERY 6 HOURS PRN
Status: DISCONTINUED | OUTPATIENT
Start: 2021-03-03 | End: 2021-03-04 | Stop reason: HOSPADM

## 2021-03-03 RX ADMIN — ENOXAPARIN SODIUM 40 MG: 40 INJECTION SUBCUTANEOUS at 05:57

## 2021-03-03 RX ADMIN — LISINOPRIL 5 MG: 5 TABLET ORAL at 05:57

## 2021-03-03 RX ADMIN — KETOROLAC TROMETHAMINE 15 MG: 30 INJECTION, SOLUTION INTRAMUSCULAR at 14:32

## 2021-03-03 RX ADMIN — POTASSIUM CHLORIDE 40 MEQ: 1500 TABLET, EXTENDED RELEASE ORAL at 07:54

## 2021-03-03 RX ADMIN — FUROSEMIDE 20 MG: 20 TABLET ORAL at 05:57

## 2021-03-03 RX ADMIN — CARVEDILOL 12.5 MG: 6.25 TABLET, FILM COATED ORAL at 17:40

## 2021-03-03 RX ADMIN — LORAZEPAM 0.5 MG: 2 INJECTION INTRAMUSCULAR; INTRAVENOUS at 05:56

## 2021-03-03 RX ADMIN — ASPIRIN 81 MG: 81 TABLET, COATED ORAL at 05:57

## 2021-03-03 RX ADMIN — ATORVASTATIN CALCIUM 20 MG: 20 TABLET, FILM COATED ORAL at 17:40

## 2021-03-03 RX ADMIN — KETOROLAC TROMETHAMINE 15 MG: 30 INJECTION, SOLUTION INTRAMUSCULAR at 21:47

## 2021-03-03 RX ADMIN — CARVEDILOL 12.5 MG: 6.25 TABLET, FILM COATED ORAL at 07:55

## 2021-03-03 RX ADMIN — LORAZEPAM 0.5 MG: 2 INJECTION INTRAMUSCULAR; INTRAVENOUS at 00:00

## 2021-03-03 RX ADMIN — SENNOSIDES AND DOCUSATE SODIUM 2 TABLET: 8.6; 5 TABLET ORAL at 05:57

## 2021-03-03 RX ADMIN — DIPHENHYDRAMINE HCL 25 MG: 25 TABLET ORAL at 21:46

## 2021-03-03 RX ADMIN — DIPHENHYDRAMINE HCL 25 MG: 25 TABLET ORAL at 00:00

## 2021-03-03 RX ADMIN — POLYETHYLENE GLYCOL 3350 1 PACKET: 17 POWDER, FOR SOLUTION ORAL at 05:58

## 2021-03-03 RX ADMIN — OXYCODONE HYDROCHLORIDE AND ACETAMINOPHEN 1 TABLET: 5; 325 TABLET ORAL at 05:57

## 2021-03-03 ASSESSMENT — ENCOUNTER SYMPTOMS
DIARRHEA: 0
HEADACHES: 0
BACK PAIN: 0
FOCAL WEAKNESS: 0
SHORTNESS OF BREATH: 0
DIZZINESS: 0
FEVER: 0
SENSORY CHANGE: 0
CHILLS: 0
BLURRED VISION: 0
ABDOMINAL PAIN: 0
COUGH: 0
EYE PAIN: 0
CONSTIPATION: 0
PALPITATIONS: 0
VOMITING: 0
INSOMNIA: 0
NAUSEA: 1

## 2021-03-03 ASSESSMENT — LIFESTYLE VARIABLES: SUBSTANCE_ABUSE: 0

## 2021-03-03 NOTE — PROGRESS NOTES
Hospital Medicine Daily Progress Note    Date of Service  3/3/2021    Chief Complaint  80 y.o. female admitted 3/1/2021 with chest pain, nausea    Hospital Course  80-year-old female with history of osteoarthritis and chronic kidney disease(fibromuscular dysplasia) with hypertension presented 3/1 with generalized weakness and chest pain, on 2/24 the patient had right total shoulder arthroplasty with no complication, she was discharged after however she came back on 2/27 with hypotension 77/37, patient was admitted for 1 night and her blood pressure was adjusted (reduce Lasix to 20 mg daily and we discontinued ACE inhibitor and continue carvedilol 12.5 mg twice daily), today the patient states she had pressure chest pain when she was sitting, with nausea and vomiting, no palpitation or syncope her pain was not severe, she has never had this pain before, also she has had worsening dyspnea with a dry coughing and no fever, on admission EKG did not show any ischemic changes except a chronic LBBB, and troponin around 20s however BNP was high more than 5000, the patient had crackles and she needed oxygen 3 L nasal cannula, chest x-ray showed, reviewed personally cardiomegaly with possible pulmonary edema, CTA for chest did not show any signs of PE, IV Lasix was started, the patient will be admitted for acute respiratory failure with hypoxia and chest pain rule out.     Since surgery the patient has had constipation and last bowel movement was 2/24, MiraLAX was a scheduled, close monitoring.    Interval Problem Update  Patient with nausea overnight, given zofran, then benadryl, then ativan. Nausea resolved however patient very somnolent at bedside this morning given recent ativan administration.  Family at bedside concerned patient is hypoxic during sleep, as well as patient's mentation not back to baseline.  Narcotic medications may be contributing to patient's mentation, will stop opioids and start alternative pain  medications.  Lung exam with minimal crackles, continue lasix PO.  Patient with bowel movement today.  Will monitor overnight tonight with hopes of improvement in oxygenation and mentation, with tentative plan to discharge home tomorrow. Home O2 eval in the morning.    Consultants/Specialty  none    Code Status  Full Code    Disposition  Inpatient, anticipate discharge home tomorrow morning if remains clinically stable. Plan for home oxygen eval tomorrow morning 3/4.    Review of Systems  Review of Systems   Constitutional: Negative for chills and fever.   Eyes: Negative for blurred vision and pain.   Respiratory: Negative for cough and shortness of breath.    Cardiovascular: Negative for chest pain, palpitations and leg swelling.   Gastrointestinal: Positive for nausea. Negative for abdominal pain, constipation, diarrhea and vomiting.   Genitourinary: Negative for dysuria and urgency.   Musculoskeletal: Negative for back pain.   Skin: Negative for itching and rash.   Neurological: Negative for dizziness, sensory change, focal weakness and headaches.   Psychiatric/Behavioral: Negative for substance abuse. The patient does not have insomnia.         Physical Exam  Temp:  [36.8 °C (98.2 °F)-37 °C (98.6 °F)] 36.9 °C (98.5 °F)  Pulse:  [64-72] 64  Resp:  [16-18] 16  BP: (104-149)/(51-77) 130/57  SpO2:  [80 %-99 %] 99 %    Physical Exam  Vitals reviewed.   Constitutional:       General: She is not in acute distress.     Appearance: She is not diaphoretic.   HENT:      Head: Normocephalic and atraumatic.      Right Ear: External ear normal.      Left Ear: External ear normal.      Nose: Nose normal. No congestion.      Mouth/Throat:      Pharynx: No oropharyngeal exudate or posterior oropharyngeal erythema.   Eyes:      Extraocular Movements: Extraocular movements intact.      Pupils: Pupils are equal, round, and reactive to light.   Cardiovascular:      Rate and Rhythm: Normal rate and regular rhythm.   Pulmonary:       Effort: Pulmonary effort is normal. No respiratory distress.      Breath sounds: Rales present. No wheezing.   Abdominal:      General: Bowel sounds are normal. There is no distension.      Palpations: Abdomen is soft.      Tenderness: There is no abdominal tenderness.   Musculoskeletal:         General: No swelling. Normal range of motion.      Cervical back: Normal range of motion and neck supple.   Skin:     General: Skin is warm and dry.   Neurological:      General: No focal deficit present.      Mental Status: She is alert and oriented to person, place, and time.      Sensory: No sensory deficit.      Motor: No weakness.   Psychiatric:         Mood and Affect: Mood normal.         Behavior: Behavior normal.         Fluids    Intake/Output Summary (Last 24 hours) at 3/3/2021 1250  Last data filed at 3/3/2021 1200  Gross per 24 hour   Intake 100 ml   Output 575 ml   Net -475 ml       Laboratory  Recent Labs     03/01/21  1006 03/02/21  0157   WBC 5.6 6.2   RBC 2.82* 2.80*   HEMOGLOBIN 8.8* 8.7*   HEMATOCRIT 26.1* 26.4*   MCV 92.6 94.3   MCH 31.2 31.1   MCHC 33.7 33.0*   RDW 47.4 48.5   PLATELETCT 273 280   MPV 9.8 9.9     Recent Labs     03/01/21  1006 03/02/21  0157 03/03/21  0145   SODIUM 134* 134* 131*   POTASSIUM 3.9 3.7 3.3*   CHLORIDE 93* 95* 90*   CO2 31 31 31   GLUCOSE 139* 111* 138*   BUN 10 8 8   CREATININE 0.85 0.88 0.90   CALCIUM 9.0 8.8 8.8             Recent Labs     03/02/21  0157   TRIGLYCERIDE 154*   HDL 57   LDL 67       Imaging  NM-CARDIAC STRESS TEST   Final Result      EC-ECHOCARDIOGRAM COMPLETE W/ CONT   Final Result      CT-CTA CHEST PULMONARY ARTERY W/ RECONS   Final Result         1.  No evidence of pulmonary embolism.   2.  Recent right shoulder surgery. Soft tissue edema about the right shoulder and in the anterior chest.   3.  Right lower lobe atelectasis. No significant consolidation or pleural effusion.   4.  Atherosclerosis.               DX-CHEST-PORTABLE (1 VIEW)   Final Result       Mild cardiomegaly.           Assessment/Plan  * Acute respiratory failure with hypoxia (HCC)  Assessment & Plan  Improving, down to 0-2L, continue to wean  Came with a crackles and needs oxygen 3 L nasal cannula, her baseline on room air.  Chest x-ray showed possible pulmonary edema with cardiomegaly, BNP very high  Echocardiogram normal  Continue PO lasix  Cardiac stress test normal    Debility  Assessment & Plan  After surgery with generalized weakness  The patient had some confusing according to the family  Check TSH and vitamin D  PT/OT recommend outpatient services    Pain in the chest  Assessment & Plan  Pressure chest pain with nausea and shortness of breath  EKG chronic LBBB and troponin mildly elevated in 120s  BNP more than 5000  Likely due to pulmonary congestion as patient notes it has improved with lasix treatment  Echocardiogram normal  Stress test normal    HTN (hypertension)- (present on admission)  Assessment & Plan  improved  Came with blood pressure more than 170/90  Continue Coreg 12.5 mg twice daily  We will start with small dose of lisinopril 5 mg daily  Start lasix 20 mg PO daily  Adjust the medication if needed    CN (constipation)  Assessment & Plan  BM today 3/3, continue bowel regimen  Bowel sounds are good  Patient is taking oxycodone for pain after surgery  Senna docusate and MiraLAX scheduled  Advance diet  Enema if needed    Elevated TSH  Assessment & Plan  Free T4 low, started on levothyroxine 25 mcg daily  Follow up with PCP    Fibromuscular dysplasia (HCC)  Assessment & Plan  Complicated with chronic kidney disease  Creatinine 0.8 and GFR more than 50 on admission  Patient received contrast for CTA  Monitor kidney function daily and avoid nephrotoxic medication       VTE prophylaxis: lovenox

## 2021-03-03 NOTE — PROGRESS NOTES
Patient woke up and wanted to ambulate, patient walked 300 feet with 1x assist. Patient went to the the BR had large BM, then wanted to sit up in chair for lunch. Patient was left with call light in reach and daughter in room    12:00 RN called to room, patient vomited approximately 400 ml, after consuming 1/2 bowl of soup and applesauce. Patient back in bed and has no c/o of nausea or pain and wants to take a nap. Daughter at beside

## 2021-03-03 NOTE — PROGRESS NOTES
Report received from Ayse. Assumed pt care. Pt is AOx4 but difficult to arouse, but repnding appropriately.  VS checked, within limits. Denies any pain at this time.  Denies any other distress.  Discussed POC.  Pt verbalized understanding.  Hourly rounding in place. Fall precautions in place and call lights w/in reach.

## 2021-03-03 NOTE — PROGRESS NOTES
Alerted by CNA that patient is very difficult to arouse. VS checked, SpO2 80% on RA. Started on 5L NC, SpO2 improved to 99%. Pt difficult to keep awake, AOx3, disoriented to place. MD notified.

## 2021-03-03 NOTE — CARE PLAN
Problem: Safety  Goal: Will remain free from falls  Outcome: PROGRESSING AS EXPECTED  Intervention: Assess risk factors for falls  Flowsheets (Taken 3/3/2021 0820)  Pt Calls for Assistance: Yes  Intervention: Implement fall precautions  Flowsheets (Taken 3/3/2021 0820)  Environmental Precautions:   Treaded Slipper Socks on Patient   Personal Belongings, Wastebasket, Call Bell etc. in Easy Reach   Transferred to Stronger Side   Report Given to Other Health Care Providers Regarding Fall Risk   Bed in Low Position   Communication Sign for Patients & Families   Mobility Assessed & Appropriate Sign Placed     Problem: Infection  Goal: Will remain free from infection  Outcome: PROGRESSING AS EXPECTED  Intervention: Assess signs and symptoms of infection  Note: No redness, swelling, drainage, pain noted at incision site.  Intervention: Implement standard precautions and perform hand washing before and after patient contact  Note: Standard precautions in place

## 2021-03-03 NOTE — PROGRESS NOTES
Telemetry Shift Summary    Rhythm:  SR w/ BBB  HR Range: 57-69  Ectopy: r PVC, r Coup  Measurements: .20/.12/.44          Normal Values  Rhythm SR  HR Range   Measurements 0.12 / 0.20 / 0.06-0.10 / 0.30-0.52

## 2021-03-03 NOTE — PROGRESS NOTES
Telemetry Shift Summary     Rhythm SR/SB w/BBB  HR Range 50-70's  Ectopy Rare PVC    Measurements 0.18 / 0.14 / 0.46         Normal Values  Rhythm SR  HR Range    Measurements 0.12-0.20 / 0.06-0.10  / 0.30-0.52

## 2021-03-04 VITALS
HEIGHT: 64 IN | OXYGEN SATURATION: 93 % | WEIGHT: 151.46 LBS | DIASTOLIC BLOOD PRESSURE: 61 MMHG | TEMPERATURE: 98.5 F | BODY MASS INDEX: 25.86 KG/M2 | HEART RATE: 64 BPM | SYSTOLIC BLOOD PRESSURE: 153 MMHG | RESPIRATION RATE: 16 BRPM

## 2021-03-04 PROCEDURE — 99239 HOSP IP/OBS DSCHRG MGMT >30: CPT | Performed by: STUDENT IN AN ORGANIZED HEALTH CARE EDUCATION/TRAINING PROGRAM

## 2021-03-04 PROCEDURE — A9270 NON-COVERED ITEM OR SERVICE: HCPCS | Performed by: STUDENT IN AN ORGANIZED HEALTH CARE EDUCATION/TRAINING PROGRAM

## 2021-03-04 PROCEDURE — 700102 HCHG RX REV CODE 250 W/ 637 OVERRIDE(OP): Performed by: STUDENT IN AN ORGANIZED HEALTH CARE EDUCATION/TRAINING PROGRAM

## 2021-03-04 PROCEDURE — A9270 NON-COVERED ITEM OR SERVICE: HCPCS | Performed by: INTERNAL MEDICINE

## 2021-03-04 PROCEDURE — 700102 HCHG RX REV CODE 250 W/ 637 OVERRIDE(OP): Performed by: INTERNAL MEDICINE

## 2021-03-04 PROCEDURE — 700111 HCHG RX REV CODE 636 W/ 250 OVERRIDE (IP): Performed by: INTERNAL MEDICINE

## 2021-03-04 RX ORDER — ACETAMINOPHEN 325 MG/1
650 TABLET ORAL ONCE
Status: COMPLETED | OUTPATIENT
Start: 2021-03-04 | End: 2021-03-04

## 2021-03-04 RX ORDER — TRAMADOL HYDROCHLORIDE 50 MG/1
50 TABLET ORAL EVERY 6 HOURS PRN
Qty: 28 TABLET | Refills: 0 | Status: SHIPPED | OUTPATIENT
Start: 2021-03-04 | End: 2021-03-18

## 2021-03-04 RX ADMIN — CARVEDILOL 12.5 MG: 6.25 TABLET, FILM COATED ORAL at 17:16

## 2021-03-04 RX ADMIN — LISINOPRIL 5 MG: 5 TABLET ORAL at 05:49

## 2021-03-04 RX ADMIN — ATORVASTATIN CALCIUM 20 MG: 20 TABLET, FILM COATED ORAL at 17:16

## 2021-03-04 RX ADMIN — TRAMADOL HYDROCHLORIDE 50 MG: 50 TABLET, FILM COATED ORAL at 13:23

## 2021-03-04 RX ADMIN — ACETAMINOPHEN 650 MG: 325 TABLET, FILM COATED ORAL at 11:01

## 2021-03-04 RX ADMIN — FUROSEMIDE 20 MG: 20 TABLET ORAL at 05:48

## 2021-03-04 RX ADMIN — CARVEDILOL 12.5 MG: 6.25 TABLET, FILM COATED ORAL at 07:56

## 2021-03-04 RX ADMIN — ENOXAPARIN SODIUM 40 MG: 40 INJECTION SUBCUTANEOUS at 05:49

## 2021-03-04 RX ADMIN — ASPIRIN 81 MG: 81 TABLET, COATED ORAL at 05:48

## 2021-03-04 ASSESSMENT — PAIN DESCRIPTION - PAIN TYPE: TYPE: CHRONIC PAIN

## 2021-03-04 NOTE — DISCHARGE PLANNING
02 order still being processed per [facility]. LSW updated JENNIFER Harper who will f/u on order.   LSW updated bedside RN

## 2021-03-04 NOTE — FACE TO FACE
"Face to Face Note  -  Durable Medical Equipment    Jeffrey Bundy M.D. - NPI: [id number]  I certify that this patient is under my care and that they had a durable medical equipment(DME)face to face encounter by myself that meets the physician DME face-to-face encounter requirements with this patient on:    Date of encounter:   Patient:                    MRN:                       YOB: 2020  Amada Mcduffie  [id number]  1940     The encounter with the patient was in whole, or in part, for the following medical condition, which is the primary reason for durable medical equipment:  Other - pulmonary edema, hypoxia    I certify that, based on my findings, the following durable medical equipment is medically necessary:  Oxygen.    HOME O2 Saturation Measurements:(Values must be present for Home Oxygen orders)     Nocturnal oximetry:    Setup Time: 2000     O2 (LPM): 0      Study End Time: 0500    Room air desaturation dropped to 86% for greater than 30 minutes.  Placed on 2LPM nasal cannula, saturation improved to 94-96%.   ,     ,         My Clinical findings support the need for the above equipment due to:  Hypoxia    Supporting Symptoms: The patient requires supplemental oxygen, as the following interventions have been tried with limited or no improvement: \"Ambulation with oximetry    If patient feels more short of breath, they can go up to 6 liters per minute and contact healthcare provider.  "

## 2021-03-04 NOTE — DISCHARGE PLANNING
Received Choice form at 1140  Agency/Facility Name: [facility]  Referral sent per Choice form @ 2051     @7229  Resent the fax to [phone] @1510  [facility] is working on the order.

## 2021-03-04 NOTE — PROGRESS NOTES
Patient resting, complains of back ache. Patient has no signs of distress or other acute needs at this time. Will continue to monitor.

## 2021-03-04 NOTE — DISCHARGE SUMMARY
Discharge Summary    CHIEF COMPLAINT ON ADMISSION  Chief Complaint   Patient presents with   • Chest Pain     Pt d/c yesterday from hospital, today  returns feeling worse. C/o low RA 2 sat, Chest pressure, nausea, high BP.    • Shortness of Breath     Denies cough.        Reason for Admission  shortness of breath, chest pain    Admission Date  3/1/2021    CODE STATUS  Full Code    HPI & HOSPITAL COURSE  80-year-old female with history of osteoarthritis and chronic kidney disease(fibromuscular dysplasia) with hypertension presented 3/1 with generalized weakness and chest pain. Patient with recent right total shoulder arthroplasty 2/24, discharged home but readmitted 2/27 with hypotension which was corrected with medication adjustment including discontinuing home lisinopril and lasix. Patient during current hospital presentation was found to be fluid overloaded with acute hypoxia on 3L oxygen, with associated chest discomfort. She was ruled out for ACS with negative stress test. BNP was elevated, so echocardiogram done which did not show signs of heart failure. Acute hypoxia was due to fluid overload likely due to recent discontinuation of lasix. Patient diuresed with good effect, patient ambulating well on room air. Patient's home blood pressure medications restarted, tolerating well. Patient's oxygen was noted to desaturate with sleep, nocturnal oximetry showing patient requiring 2L oxygen. Home oxygen set up for patient for nocturnal use. Tramadol used for shoulder pain with good effect, patient disoriented and drowsy with oxycodone. Mentation back baseline at time of discharge. TSH also noted to be elevated, with low free T4; patient started on levothyroxine. She is to follow up with PCP for continued management.    Therefore, she is discharged in fair and stable condition to home with close outpatient follow-up.    The patient met 2-midnight criteria for an inpatient stay at the time of discharge.    Discharge  Date  3/4/2021    FOLLOW UP ITEMS POST DISCHARGE  Take medications as prescribed.  Follow up with PCP.    DISCHARGE DIAGNOSES  Principal Problem:    Acute respiratory failure with hypoxia (HCC) POA: Unknown  Active Problems:    HTN (hypertension) POA: Yes    Pain in the chest POA: Unknown    Debility POA: Unknown    CN (constipation) POA: Unknown    Fibromuscular dysplasia (HCC) POA: Unknown    Elevated TSH POA: Unknown  Resolved Problems:    * No resolved hospital problems. *      FOLLOW UP  No future appointments.  No follow-up provider specified.    MEDICATIONS ON DISCHARGE     Medication List      START taking these medications      Instructions   levothyroxine 25 MCG Tabs  Commonly known as: SYNTHROID   Take 1 tablet by mouth Every morning on an empty stomach for 30 days.  Dose: 25 mcg     lisinopril 5 MG Tabs  Commonly known as: PRINIVIL   Take 1 tablet by mouth every day for 30 days.  Dose: 5 mg     traMADol 50 MG Tabs  Commonly known as: ULTRAM   Take 1 tablet by mouth every 6 hours as needed for Moderate Pain for up to 14 days.  Dose: 50 mg        CHANGE how you take these medications      Instructions   * furosemide 20 MG Tabs  What changed: You were already taking a medication with the same name, and this prescription was added. Make sure you understand how and when to take each.  Commonly known as: LASIX   Take 1 tablet by mouth every day for 30 days.  Dose: 20 mg     * furosemide 20 MG Tabs  What changed: Another medication with the same name was added. Make sure you understand how and when to take each.  Commonly known as: LASIX   Take 1 tablet by mouth every day.  Dose: 20 mg         * This list has 2 medication(s) that are the same as other medications prescribed for you. Read the directions carefully, and ask your doctor or other care provider to review them with you.            CONTINUE taking these medications      Instructions   acetaminophen 500 MG Tabs  Commonly known as: TYLENOL   Take 1,000  mg by mouth every 6 hours as needed.  Dose: 1,000 mg     BERBERINE COMPLEX PO   Take 1 tablet by mouth every day.  Dose: 1 tablet     carvedilol 12.5 MG Tabs  Commonly known as: COREG   Take 12.5 mg by mouth 2 times a day, with meals.  Dose: 12.5 mg     CO Q10 PO   Take 1 tablet by mouth every day.  Dose: 1 tablet     DHEA 25 MG Tabs   Take 25 mg by mouth every day.  Dose: 25 mg     NON SPECIFIED   Take 1 capsule by mouth every morning. PS-Thyroid 0.5 grain capsule olive oil veggie (compounded)  Dose: 1 capsule     NON SPECIFIED   Take 1 tablet by mouth every day. EpiCor 500 mg daily  Dose: 1 tablet     Omega 3 1200 MG Caps   Take 2 Capsules by mouth every day. With EPA and DHA  Dose: 2 capsule     ondansetron 4 MG Tabs tablet  Commonly known as: Zofran   Take 1 tablet by mouth every four hours as needed for Nausea/Vomiting for up to 15 days.  Dose: 4 mg     oxyCODONE-acetaminophen 5-325 MG Tabs  Commonly known as: PERCOCET   Take 1 tablet by mouth every four hours as needed.  Dose: 1 tablet     simvastatin 20 MG Tabs  Commonly known as: ZOCOR   Take 1 Tab by mouth every evening.  Dose: 20 mg     vitamin A 3 mg (62467 units) capsule   Take 10,000 Units by mouth see administration instructions. 3 per week  Dose: 10,000 Units     VITAMIN C PO   Take 1 tablet by mouth every day.  Dose: 1 tablet     VITAMIN D3 PO   Take 1 tablet by mouth every day.  Dose: 1 tablet            Allergies  Allergies   Allergen Reactions   • Diltiazem Rash     Increased BP   • Morphine Vomiting   • Pcn [Penicillins] Rash     Rash throughout the body       DIET  Orders Placed This Encounter   Procedures   • Diet Order Diet: Cardiac     Standing Status:   Standing     Number of Occurrences:   1     Order Specific Question:   Diet:     Answer:   Cardiac [6]       ACTIVITY  As tolerated.  Weight bearing as tolerated    CONSULTATIONS  none    PROCEDURES  none    LABORATORY  Lab Results   Component Value Date    SODIUM 131 (L) 03/03/2021     POTASSIUM 3.3 (L) 03/03/2021    CHLORIDE 90 (L) 03/03/2021    CO2 31 03/03/2021    GLUCOSE 138 (H) 03/03/2021    BUN 8 03/03/2021    CREATININE 0.90 03/03/2021        Lab Results   Component Value Date    WBC 6.2 03/02/2021    HEMOGLOBIN 8.7 (L) 03/02/2021    HEMATOCRIT 26.4 (L) 03/02/2021    PLATELETCT 280 03/02/2021        Total time of the discharge process exceeds 36 minutes.

## 2021-03-04 NOTE — PROGRESS NOTES
Patient asking for oral tylenol. No tylenol currently available for administration. Patient declines currently medications available at this tmie. MD paged. MD called writer with orders.

## 2021-03-04 NOTE — DISCHARGE PLANNING
Anticipated Discharge Disposition: home with nocturnal 02    Action: LSW spoke with [facility] to see if they will do nocturnal 02 without sleep study (nocturnal oximetry reading only). Per [facility], they can run the order and see.   Pt agreeable to send referral to [facility].      Barriers to Discharge: 02 acceptance, delivery     Plan: LSW to f/u

## 2021-03-04 NOTE — PROGRESS NOTES
Report received from SULAIMAN Tavera. Dx, medications, O2 needs, and poc reviewed. Safety precautions in place and pt has no sign of distress or acute needs at this time. Care fully assumed. Will continue to monitor.

## 2021-03-04 NOTE — RESPIRATORY CARE
Nocturnal Oximetry    Setup Time: 2000    O2 (LPM): 0     RN Notified: yes    Study End Time: 0500      Room air desaturation dropped to 86% for greater than 30 min.    Placed on 2 LPM nasal cannula, saturation improved to 94-96%.

## 2021-03-05 NOTE — DISCHARGE PLANNING
Anticipated Discharge Disposition: Home     Action: Called [facility] to follow up on pt's referral. Spoke with Annette who states referral was accepted. Since O2 is only for nocturnal use, nothing needs to be delivered to the hospital. Annette states pt or family can call once pt is home and  will drop equipment off.      LSW called pt's daughter, Loren to notify. Loren states that plan is for pt to return home with her for a few weeks at [address] [facility], [address]. She states this is closer to [facility].     LSW called [facility] again and spoke with [facility]. Provided update on address and verified they still service that area. [facility] states they service both [place] and [facility] and they won't have any issue delivering equipment there.     Pt's daughter updated and given number and instructed to call [facility] when they get home. Daughter voiced understanding.     Barriers to Discharge: None    Plan: Care coordination will continue to follow and assist with discharge planning

## 2021-03-05 NOTE — DISCHARGE INSTRUCTIONS
Discharge Instructions    Discharged to home by car with relative. Discharged via wheelchair, hospital escort: Yes.  Special equipment needed: Wheelchair    Be sure to schedule a follow-up appointment with your primary care doctor or any specialists as instructed.     Discharge Plan:   Influenza Vaccine Indication: Not indicated: Previously immunized this influenza season and > 8 years of age    I understand that a diet low in cholesterol, fat, and sodium is recommended for good health. Unless I have been given specific instructions below for another diet, I accept this instruction as my diet prescription.   Other diet: Regular    Special Instructions: None    · Is patient discharged on Warfarin / Coumadin?   No covid  Flank Pain, Adult  Flank pain is pain that is located on the side of the body between the upper abdomen and the back. This area is called the flank. The pain may occur over a short period of time (acute), or it may be long-term or recurring (chronic). It may be mild or severe. Flank pain can be caused by many things, including:  · Muscle soreness or injury.  · Kidney stones or kidney disease.  · Stress.  · A disease of the spine (vertebral disk disease).  · A lung infection (pneumonia).  · Fluid around the lungs (pulmonary edema).  · A skin rash caused by the chickenpox virus (shingles).  · Tumors that affect the back of the abdomen.  · Gallbladder disease.  Follow these instructions at home:    · Drink enough fluid to keep your urine clear or pale yellow.  · Rest as told by your health care provider.  · Take over-the-counter and prescription medicines only as told by your health care provider.  · Keep a journal to track what has caused your flank pain and what has made it feel better.  · Keep all follow-up visits as told by your health care provider. This is important.  Contact a health care provider if:  · Your pain is not controlled with medicine.  · You have new symptoms.  · Your pain gets  worse.  · You have a fever.  · Your symptoms last longer than 2-3 days.  · You have trouble urinating or you are urinating very frequently.  Get help right away if:  · You have trouble breathing or you are short of breath.  · Your abdomen hurts or it is swollen or red.  · You have nausea or vomiting.  · You feel faint or you pass out.  · You have blood in your urine.  Summary  · Flank pain is pain that is located on the side of the body between the upper abdomen and the back.  · The pain may occur over a short period of time (acute), or it may be long-term or recurring (chronic). It may be mild or severe.  · Flank pain can be caused by many things.  · Contact your health care provider if your symptoms get worse or they last longer than 2-3 days.  This information is not intended to replace advice given to you by your health care provider. Make sure you discuss any questions you have with your health care provider.  Document Released: 02/08/2007 Document Revised: 11/30/2018 Document Reviewed: 03/02/2018  [employer] Patient Education © 2020 [employer]    Healthy Eating  Following a healthy eating pattern may help you to achieve and maintain a healthy body weight, reduce the risk of chronic disease, and live a long and productive life. It is important to follow a healthy eating pattern at an appropriate calorie level for your body. Your nutritional needs should be met primarily through food by choosing a variety of nutrient-rich foods.  What are tips for following this plan?  Reading food labels  · Read labels and choose the following:  ? Reduced or low sodium.  ? Juices with 100% fruit juice.  ? Foods with low saturated fats and high polyunsaturated and monounsaturated fats.  ? Foods with whole grains, such as whole wheat, cracked wheat, brown rice, and wild rice.  ? Whole grains that are fortified with folic acid. This is recommended for women who are pregnant or who want to become pregnant.  · Read labels and avoid  "the following:  ? Foods with a lot of added sugars. These include foods that contain brown sugar, corn sweetener, corn syrup, dextrose, fructose, glucose, high-fructose corn syrup, honey, invert sugar, lactose, malt syrup, maltose, molasses, raw sugar, sucrose, trehalose, or turbinado sugar.  § Do not eat more than the following amounts of added sugar per day:  § 6 teaspoons (25 g) for women.  § 9 teaspoons (38 g) for men.  ? Foods that contain processed or refined starches and grains.  ? Refined grain products, such as white flour, degermed cornmeal, white bread, and white rice.  Shopping  · Choose nutrient-rich snacks, such as vegetables, whole fruits, and nuts. Avoid high-calorie and high-sugar snacks, such as potato chips, fruit snacks, and candy.  · Use oil-based dressings and spreads on foods instead of solid fats such as butter, stick margarine, or cream cheese.  · Limit pre-made sauces, mixes, and \"instant\" products such as flavored rice, instant noodles, and ready-made pasta.  · Try more plant-protein sources, such as tofu, tempeh, black beans, edamame, lentils, nuts, and seeds.  · Explore eating plans such as the Mediterranean diet or vegetarian diet.  Cooking  · Use oil to sauté or stir-stinson foods instead of solid fats such as butter, stick margarine, or lard.  · Try baking, boiling, grilling, or broiling instead of frying.  · Remove the fatty part of meats before cooking.  · Steam vegetables in water or broth.  Meal planning    · At meals, imagine dividing your plate into fourths:  ? One-half of your plate is fruits and vegetables.  ? One-fourth of your plate is whole grains.  ? One-fourth of your plate is protein, especially lean meats, poultry, eggs, tofu, beans, or nuts.  · Include low-fat dairy as part of your daily diet.  Lifestyle  · Choose healthy options in all settings, including home, work, school, restaurants, or stores.  · Prepare your food safely:  ? Wash your hands after handling raw " meats.  ? Keep food preparation surfaces clean by regularly washing with hot, soapy water.  ? Keep raw meats separate from ready-to-eat foods, such as fruits and vegetables.  ? , meat, poultry, and eggs to the recommended internal temperature.  ? Store foods at safe temperatures. In general:  § Keep cold foods at 40°F (4.4°C) or below.  § Keep hot foods at 140°F (60°C) or above.  § Keep your freezer at 0°F (-17.8°C) or below.  § Foods are no longer safe to eat when they have been between the temperatures of 40°-140°F (4.4-60°C) for more than 2 hours.  What foods should I eat?  Fruits  Aim to eat 2 cup-equivalents of fresh, canned (in natural juice), or frozen fruits each day. Examples of 1 cup-equivalent of fruit include 1 small apple, 8 large strawberries, 1 cup canned fruit, ½ cup dried fruit, or 1 cup 100% juice.  Vegetables  Aim to eat 2½-3 cup-equivalents of fresh and frozen vegetables each day, including different varieties and colors. Examples of 1 cup-equivalent of vegetables include 2 medium carrots, 2 cups raw, leafy greens, 1 cup chopped vegetable (raw or cooked), or 1 medium baked potato.  Grains  Aim to eat 6 ounce-equivalents of whole grains each day. Examples of 1 ounce-equivalent of grains include 1 slice of bread, 1 cup ready-to-eat cereal, 3 cups popcorn, or ½ cup cooked rice, pasta, or cereal.  Meats and other proteins  Aim to eat 5-6 ounce-equivalents of protein each day. Examples of 1 ounce-equivalent of protein include 1 egg, 1/2 cup nuts or seeds, or 1 tablespoon (16 g) peanut butter. A cut of meat or fish that is the size of a deck of cards is about 3-4 ounce-equivalents.  · Of the protein you eat each week, try to have at least 8 ounces come from seafood. This includes salmon, trout, herring, and anchovies.  Dairy  Aim to eat 3 cup-equivalents of fat-free or low-fat dairy each day. Examples of 1 cup-equivalent of dairy include 1 cup (240 mL) milk, 8 ounces (250 g) yogurt, 1½  ounces (44 g) natural cheese, or 1 cup (240 mL) fortified soy milk.  Fats and oils  · Aim for about 5 teaspoons (21 g) per day. Choose monounsaturated fats, such as canola and olive oils, avocados, peanut butter, and most nuts, or polyunsaturated fats, such as sunflower, corn, and soybean oils, walnuts, pine nuts, sesame seeds, sunflower seeds, and flaxseed.  Beverages  · Aim for six 8-oz glasses of water per day. Limit coffee to three to five 8-oz cups per day.  · Limit caffeinated beverages that have added calories, such as soda and energy drinks.  · Limit alcohol intake to no more than 1 drink a day for nonpregnant women and 2 drinks a day for men. One drink equals 12 oz of beer (355 mL), 5 oz of wine (148 mL), or 1½ oz of hard liquor (44 mL).  Seasoning and other foods  · Avoid adding excess amounts of salt to your foods. Try flavoring foods with herbs and spices instead of salt.  · Avoid adding sugar to foods.  · Try using oil-based dressings, sauces, and spreads instead of solid fats.  This information is based on general [place] nutrition guidelines. For more information, visit choosemyplate.gov. Exact amounts may vary based on your nutrition needs.  Summary  · A healthy eating plan may help you to maintain a healthy weight, reduce the risk of chronic diseases, and stay active throughout your life.  · Plan your meals. Make sure you eat the right portions of a variety of nutrient-rich foods.  · Try baking, boiling, grilling, or broiling instead of frying.  · Choose healthy options in all settings, including home, work, school, restaurants, or stores.  This information is not intended to replace advice given to you by your health care provider. Make sure you discuss any questions you have with your health care provider.  Document Released: 04/01/2019 Document Revised: 04/01/2019 Document Reviewed: 04/01/2019  [employer] Patient Education © 2020 [employer]    Home Oxygen Use, Adult  When a medical condition keeps  you from getting enough oxygen, your health care provider may instruct you to take extra oxygen at home. Your health care provider will let you know:  · When to take oxygen.  · For how long to take oxygen.  · How quickly oxygen should be delivered (flow rate), in liters per minute (LPM or L/M).  Home oxygen can be given through:  · A mask.  · A nasal cannula. This is a device or tube that goes in the nostrils.  · A transtracheal catheter. This is a small, flexible tube placed in the trachea.  · A tracheostomy. This is a surgically made opening in the trachea.  These devices are connected with tubing to an oxygen source, such as:  · A tank. Tanks hold oxygen in gas form. They must be replaced when the oxygen is used up.  · A liquid oxygen device. This holds oxygen in liquid form. It must be replaced when the oxygen is used up.  · An oxygen concentrator machine. This filters oxygen in the room. It uses electricity, so you must have a backup cylinder of oxygen in case the power goes out.  Supplies needed:  To use oxygen, you will need:  · A mask, nasal cannula, transtracheal catheter, or tracheostomy.  · An oxygen tank, a liquid oxygen device, or an oxygen concentrator.  · The tape that your health care provider recommends (optional).  If you use a transtracheal catheter and your prescribed flow rate is 1 LPM or greater, you will also need a humidifier.  Risks and complications  · Fire. This can happen if the oxygen is exposed to a heat source, flame, or spark.  · Injury to skin. This can happen if liquid oxygen touches your skin.  · Organ damage. This can happen if you get too little oxygen.  How to use oxygen  Your health care provider or a representative from your medical device company will show you how to use your oxygen device. Follow her or his instructions. The instructions may look something like this:  1. Wash your hands.  2. If you use an oxygen concentrator, make sure it is plugged in.  3. Place one end of  "the tube into the port on the tank, device, or machine.  4. Place the mask over your nose and mouth. Or, place the nasal cannula and secure it with tape if instructed. If you use a tracheostomy or transtracheal catheter, connect it to the oxygen source as directed.  5. Make sure the liter-flow setting on the machine is at the level prescribed by your health care provider.  6. Turn on the machine or adjust the knob on the tank or device to the correct liter-flow setting.  7. When you are done, turn off and unplug the machine, or turn the knob to OFF.  How to clean and care for the oxygen supplies  Nasal cannula  · Clean it with a warm, wet cloth daily or as needed.  · Wash it with a liquid soap once a week.  · Rinse it thoroughly once or twice a week.  · Replace it every 2-4 weeks.  · If you have an infection, such as a cold or pneumonia, change the cannula when you get better.  Mask  · Replace it every 2-4 weeks.  · If you have an infection, such as a cold or pneumonia, change the mask when you get better.  Humidifier bottle  · Wash the bottle between each refill:  ? Wash it with soap and warm water.  ? Rinse it thoroughly.  ? Disinfect it and its top.  ? Air-dry it.  · Make sure it is dry before you refill it.  Oxygen concentrator  · Clean the air filter at least twice a week according to directions from your home medical equipment and service company.  · Wipe down the cabinet every day. To do this:  ? Unplug the unit.  ? Wipe down the cabinet with a damp cloth.  ? Dry the cabinet.  Other equipment  · Change any extra tubing every 1-3 months.  · Follow instructions from your health care provider about taking care of any other equipment.  Safety tips  Fire safety tips    · Keep your oxygen and oxygen supplies at least 5 ft away from sources of heat, flames, and miller at all times.  · Do not allow smoking near your oxygen. Put up \"no smoking\" signs in your home. Avoid smoking areas when in public.  · Do not use " materials that can burn (are flammable) while you use oxygen.  · When you go to a restaurant with portable oxygen, ask to be seated in the nonsmoking section.  · Keep a fire extinguisher close by. Let your fire department know that you have oxygen in your home.  · Test your home smoke detectors regularly.  Traveling  · Secure your oxygen tank in the vehicle so that it does not move around. Follow instructions from your medical device company about how to safely secure your tank.  · Make sure you have enough oxygen for the amount of time you will be away from home.  · If you are planning air travel, contact the airline to find out if they allow the use of an approved portable oxygen concentrator. You may also need documents from your health care provider and medical device company before you travel.  General safety tips  · If you use an oxygen cylinder, make sure it is in a stand or secured to an object that will not move (fixed object).  · If you use liquid oxygen, make sure its container is kept upright.  · If you use an oxygen concentrator:  ? Tell your electric company. Make sure you are given priority service in the event that your power goes out.  ? Avoid using extension cords, if possible.  Follow these instructions at home:  · Use oxygen only as told by your health care provider.  · Do not use alcohol or other drugs that make you relax (sedating drugs) unless instructed. They can slow down your breathing rate and make it hard to get in enough oxygen.  · Know how and when to order a refill of oxygen.  · Always keep a spare tank of oxygen. Plan ahead for holidays when you may not be able to get a prescription filled.  · Use water-based lubricants on your lips or nostrils. Do not use oil-based products like petroleum jelly.  · To prevent skin irritation on your cheeks or behind your ears, tuck some gauze under the tubing.  Contact a health care provider if:  · You get headaches often.  · You have shortness of  breath.  · You have a lasting cough.  · You have anxiety.  · You are sleepy all the time.  · You develop an illness that affects your breathing.  · You cannot exercise at your regular level.  · You are restless.  · You have difficult or irregular breathing, and it is getting worse.  · You have a fever.  · You have persistent redness under your nose.  Get help right away if:  · You are confused.  · You have blue lips or fingernails.  · You are struggling to breathe.  Summary  · Your health care provider or a representative from your medical device company will show you how to use your oxygen device. Follow her or his instructions.  · If you use an oxygen concentrator, make sure it is plugged in.  · Make sure the liter-flow setting on the machine is at the level prescribed by your health care provider.  · Keep your oxygen and oxygen supplies at least 5 ft away from sources of heat, flames, and miller at all times.  This information is not intended to replace advice given to you by your health care provider. Make sure you discuss any questions you have with your health care provider.  Document Released: 03/09/2005 Document Revised: 06/06/2019 Document Reviewed: 07/11/2017  [employer] Patient Education © 2020 [employer]    Hand Washing  Germs such as bacteria, viruses, and parasites are found everywhere. They can be in the air and water, and they can be on surfaces like food, door handles, and your skin. Every day, your hands come into contact with germs. Many of these germs can make you and your family sick. Washing your hands is one of the easiest and most effective ways to lower your risk of getting and sharing germs.  When should I wash my hands?  You should wash your hands whenever you think they are dirty. You should also wash your hands:  · Before:  ? Visiting a baby or anyone with a weakened disease-fighting system (immunesystem).  ? Putting in or taking out contact lenses.  · After:  ? Using the bathroom or  helping someone else use the bathroom.  ? Working or playing outside.  ? Touching or taking out the garbage.  ? Touching anything dirty around your home.  ? Sneezing, coughing, or blowing your nose.  ? Using a phone, including your mobile phone.  ? Touching an animal, animal food, animal waste, or its toys or leash.  ? Touching money.  ? Using household  or toxic chemicals.  ? Handling soiled clothes, bedding, or rags.  ? Using public transportation.  ? Going shopping, especially if you use a shopping cart or basket.  ? Shaking hands.  ? Handling livestock.  · Before and after:  ? Preparing food.  ? Eating.  ? Visiting or taking care of someone who is sick. This includes touching used tissues, toys, and clothes.  ? Changing a bandage (dressing) or taking care of an injury or wound.  ? Giving or taking medicine.  ? Preparing a bottle for a baby.  ? Feeding a baby or young child.  ? Changing a diaper.  What is the correct way to wash my hands?    1. Wet your hands with clean, running water. Turn off the water or move your hands out of the running water.  2. Apply liquid soap or bar soap to your hands.  3. Rub your hands together quickly to create lather.  4. Keep rubbing your hands together for at least 20 seconds. Thoroughly scrub all parts of your hands, including under your fingernails and between your fingers.  5. Rinse your hands with clean, running water until all the soap is gone.  6. Dry your hands using an air dryer or a clean paper or cloth towel, or let your hands air-dry. Do not use your clothing or a soiled towel to dry your hands.  If you are in a public restroom, use a paper towel:  · To turn off the water faucet.  · To open the bathroom door.  How can I clean my hands if I do not have soap and water?    If soap and clean water are not available, use an alcohol-based wipe, spray, or hand gel. Use a hand-sanitizing agent that contains at least 60% alcohol. If you are preparing food, hand  sanitizers are not recommended as a replacement for hand washing with soap and water.  To use a hand , follow the directions on the product, and:  · Apply enough product to cover your hands.  · Make sure you wipe, rub, or spray the product so that it reaches every part of your hands and wrists. Include the backs of your hands, between your fingers, and under your fingernails.  · Rub the product onto your hands until it dries.  Summary  · Germs such as bacteria, viruses, and parasites are found everywhere.  · Your hands come into contact with germs every day. Many of these germs can make you and your family sick.  · Washing your hands is one of the easiest and most effective ways to lower your risk of getting and sharing germs.  This information is not intended to replace advice given to you by your health care provider. Make sure you discuss any questions you have with your health care provider.  Document Released: 08/08/2006 Document Revised: 09/26/2018 Document Reviewed: 09/26/2018  [employer] Patient Education © 2020 [employer]    COVID-19  COVID-19 is a respiratory infection that is caused by a virus called severe acute respiratory syndrome coronavirus 2 (SARS-CoV-2). The disease is also known as coronavirus disease or novel coronavirus. In some people, the virus may not cause any symptoms. In others, it may cause a serious infection. The infection can get worse quickly and can lead to complications, such as:  · Pneumonia, or infection of the lungs.  · Acute respiratory distress syndrome or ARDS. This is fluid build-up in the lungs.  · Acute respiratory failure. This is a condition in which there is not enough oxygen passing from the lungs to the body.  · Sepsis or septic shock. This is a serious bodily reaction to an infection.  · Blood clotting problems.  · Secondary infections due to bacteria or fungus.  The virus that causes COVID-19 is contagious. This means that it can spread from person to  person through droplets from coughs and sneezes (respiratory secretions).  What are the causes?  This illness is caused by a virus. You may catch the virus by:  · Breathing in droplets from an infected person's cough or sneeze.  · Touching something, like a table or a doorknob, that was exposed to the virus (contaminated) and then touching your mouth, nose, or eyes.  What increases the risk?  Risk for infection  You are more likely to be infected with this virus if you:  · Live in or travel to an area with a COVID-19 outbreak.  · Come in contact with a sick person who recently traveled to an area with a COVID-19 outbreak.  · Provide care for or live with a person who is infected with COVID-19.  Risk for serious illness  You are more likely to become seriously ill from the virus if you:  · Are 65 years of age or older.  · Have a long-term disease that lowers your body's ability to fight infection (immunocompromised).  · Live in a nursing home or long-term care facility.  · Have a long-term (chronic) disease such as:  ? Chronic lung disease, including chronic obstructive pulmonary disease or asthma  ? Heart disease.  ? Diabetes.  ? Chronic kidney disease.  ? Liver disease.  · Are obese.  What are the signs or symptoms?  Symptoms of this condition can range from mild to severe. Symptoms may appear any time from 2 to 14 days after being exposed to the virus. They include:  · A fever.  · A cough.  · Difficulty breathing.  · Chills.  · Muscle pains.  · A sore throat.  · Loss of taste or smell.  Some people may also have stomach problems, such as nausea, vomiting, or diarrhea.  Other people may not have any symptoms of COVID-19.  How is this diagnosed?  This condition may be diagnosed based on:  · Your signs and symptoms, especially if:  ? You live in an area with a COVID-19 outbreak.  ? You recently traveled to or from an area where the virus is common.  ? You provide care for or live with a person who was diagnosed with  COVID-19.  · A physical exam.  · Lab tests, which may include:  ? A nasal swab to take a sample of fluid from your nose.  ? A throat swab to take a sample of fluid from your throat.  ? A sample of mucus from your lungs (sputum).  ? Blood tests.  · Imaging tests, which may include, X-rays, CT scan, or ultrasound.  How is this treated?  At present, there is no medicine to treat COVID-19. Medicines that treat other diseases are being used on a trial basis to see if they are effective against COVID-19.  Your health care provider will talk with you about ways to treat your symptoms. For most people, the infection is mild and can be managed at home with rest, fluids, and over-the-counter medicines.  Treatment for a serious infection usually takes places in a hospital intensive care unit (ICU). It may include one or more of the following treatments. These treatments are given until your symptoms improve.  · Receiving fluids and medicines through an IV.  · Supplemental oxygen. Extra oxygen is given through a tube in the nose, a face mask, or a peterson.  · Positioning you to lie on your stomach (prone position). This makes it easier for oxygen to get into the lungs.  · Continuous positive airway pressure (CPAP) or bi-level positive airway pressure (BPAP) machine. This treatment uses mild air pressure to keep the airways open. A tube that is connected to a motor delivers oxygen to the body.  · Ventilator. This treatment moves air into and out of the lungs by using a tube that is placed in your windpipe.  · Tracheostomy. This is a procedure to create a hole in the neck so that a breathing tube can be inserted.  · Extracorporeal membrane oxygenation (ECMO). This procedure gives the lungs a chance to recover by taking over the functions of the heart and lungs. It supplies oxygen to the body and removes carbon dioxide.  Follow these instructions at home:  Lifestyle  · If you are sick, stay home except to get medical care. Your  health care provider will tell you how long to stay home. Call your health care provider before you go for medical care.  · Rest at home as told by your health care provider.  · Do not use any products that contain nicotine or tobacco, such as cigarettes, e-cigarettes, and chewing tobacco. If you need help quitting, ask your health care provider.  · Return to your normal activities as told by your health care provider. Ask your health care provider what activities are safe for you.  General instructions  · Take over-the-counter and prescription medicines only as told by your health care provider.  · Drink enough fluid to keep your urine pale yellow.  · Keep all follow-up visits as told by your health care provider. This is important.  How is this prevented?    There is no vaccine to help prevent COVID-19 infection. However, there are steps you can take to protect yourself and others from this virus.  To protect yourself:   · Do not travel to areas where COVID-19 is a risk. The areas where COVID-19 is reported change often. To identify high-risk areas and travel restrictions, check the CDC travel website: wwwnc.cdc.gov/travel/notices  · If you live in, or must travel to, an area where COVID-19 is a risk, take precautions to avoid infection.  ? Stay away from people who are sick.  ? Wash your hands often with soap and water for 20 seconds. If soap and water are not available, use an alcohol-based hand .  ? Avoid touching your mouth, face, eyes, or nose.  ? Avoid going out in public, follow guidance from your state and local health authorities.  ? If you must go out in public, wear a cloth face covering or face mask.  ? Disinfect objects and surfaces that are frequently touched every day. This may include:  § Counters and tables.  § Doorknobs and light switches.  § Sinks and faucets.  § Electronics, such as phones, remote controls, keyboards, computers, and tablets.  To protect others:  If you have symptoms of  COVID-19, take steps to prevent the virus from spreading to others.  · If you think you have a COVID-19 infection, contact your health care provider right away. Tell your health care team that you think you may have a COVID-19 infection.  · Stay home. Leave your house only to seek medical care. Do not use public transport.  · Do not travel while you are sick.  · Wash your hands often with soap and water for 20 seconds. If soap and water are not available, use alcohol-based hand .  · Stay away from other members of your household. Let healthy household members care for children and pets, if possible. If you have to care for children or pets, wash your hands often and wear a mask. If possible, stay in your own room, separate from others. Use a different bathroom.  · Make sure that all people in your household wash their hands well and often.  · Cough or sneeze into a tissue or your sleeve or elbow. Do not cough or sneeze into your hand or into the air.  · Wear a cloth face covering or face mask.  Where to find more information  · Centers for Disease Control and Prevention: www.cdc.gov/coronavirus/2019-ncov/index.html  · World Health Organization: www.who.int/health-topics/coronavirus  Contact a health care provider if:  · You live in or have traveled to an area where COVID-19 is a risk and you have symptoms of the infection.  · You have had contact with someone who has COVID-19 and you have symptoms of the infection.  Get help right away if:  · You have trouble breathing.  · You have pain or pressure in your chest.  · You have confusion.  · You have bluish lips and fingernails.  · You have difficulty waking from sleep.  · You have symptoms that get worse.  These symptoms may represent a serious problem that is an emergency. Do not wait to see if the symptoms will go away. Get medical help right away. Call your local emergency services (911 in the [place] Do not drive yourself to the hospital. Let the emergency  medical personnel know if you think you have COVID-19.  Summary  · COVID-19 is a respiratory infection that is caused by a virus. It is also known as coronavirus disease or novel coronavirus. It can cause serious infections, such as pneumonia, acute respiratory distress syndrome, acute respiratory failure, or sepsis.  · The virus that causes COVID-19 is contagious. This means that it can spread from person to person through droplets from coughs and sneezes.  · You are more likely to develop a serious illness if you are 65 years of age or older, have a weak immunity, live in a nursing home, or have chronic disease.  · There is no medicine to treat COVID-19. Your health care provider will talk with you about ways to treat your symptoms.  · Take steps to protect yourself and others from infection. Wash your hands often and disinfect objects and surfaces that are frequently touched every day. Stay away from people who are sick and wear a mask if you are sick.  This information is not intended to replace advice given to you by your health care provider. Make sure you discuss any questions you have with your health care provider.  Document Released: 01/23/2020 Document Revised: 05/14/2020 Document Reviewed: 01/23/2020  Elsevier Patient Education © 2020 [employer]    Fall Prevention in the Home, Adult  Falls can cause injuries. They can happen to people of all ages. There are many things you can do to make your home safe and to help prevent falls. Ask for help when making these changes, if needed.  What actions can I take to prevent falls?  General Instructions  Use good lighting in all rooms. Replace any light bulbs that burn out.  Turn on the lights when you go into a dark area. Use night-lights.  Keep items that you use often in easy-to-reach places. Lower the shelves around your home if necessary.  Set up your furniture so you have a clear path. Avoid moving your furniture around.  Do not have throw rugs and other  things on the floor that can make you trip.  Avoid walking on wet floors.  If any of your floors are uneven, fix them.  Add color or contrast paint or tape to clearly sofia and help you see:  Any grab bars or handrails.  First and last steps of stairways.  Where the edge of each step is.  If you use a stepladder:  Make sure that it is fully opened. Do not climb a closed stepladder.  Make sure that both sides of the stepladder are locked into place.  Ask someone to hold the stepladder for you while you use it.  If there are any pets around you, be aware of where they are.  What can I do in the bathroom?         Keep the floor dry. Clean up any water that spills onto the floor as soon as it happens.  Remove soap buildup in the tub or shower regularly.  Use non-skid mats or decals on the floor of the tub or shower.  Attach bath mats securely with double-sided, non-slip rug tape.  If you need to sit down in the shower, use a plastic, non-slip stool.  Install grab bars by the toilet and in the tub and shower. Do not use towel bars as grab bars.  What can I do in the bedroom?  Make sure that you have a light by your bed that is easy to reach.  Do not use any sheets or blankets that are too big for your bed. They should not hang down onto the floor.  Have a firm chair that has side arms. You can use this for support while you get dressed.  What can I do in the kitchen?  Clean up any spills right away.  If you need to reach something above you, use a strong step stool that has a grab bar.  Keep electrical cords out of the way.  Do not use floor polish or wax that makes floors slippery. If you must use wax, use non-skid floor wax.  What can I do with my stairs?  Do not leave any items on the stairs.  Make sure that you have a light switch at the top of the stairs and the bottom of the stairs. If you do not have them, ask someone to add them for you.  Make sure that there are handrails on both sides of the stairs, and use  them. Fix handrails that are broken or loose. Make sure that handrails are as long as the stairways.  Install non-slip stair treads on all stairs in your home.  Avoid having throw rugs at the top or bottom of the stairs. If you do have throw rugs, attach them to the floor with carpet tape.  Choose a carpet that does not hide the edge of the steps on the stairway.  Check any carpeting to make sure that it is firmly attached to the stairs. Fix any carpet that is loose or worn.  What can I do on the outside of my home?  Use bright outdoor lighting.  Regularly fix the edges of walkways and driveways and fix any cracks.  Remove anything that might make you trip as you walk through a door, such as a raised step or threshold.  Trim any bushes or trees on the path to your home.  Regularly check to see if handrails are loose or broken. Make sure that both sides of any steps have handrails.  Install guardrails along the edges of any raised decks and porches.  Clear walking paths of anything that might make someone trip, such as tools or rocks.  Have any leaves, snow, or ice cleared regularly.  Use sand or salt on walking paths during winter.  Clean up any spills in your garage right away. This includes grease or oil spills.  What other actions can I take?  Wear shoes that:  Have a low heel. Do not wear high heels.  Have rubber bottoms.  Are comfortable and fit you well.  Are closed at the toe. Do not wear open-toe sandals.  Use tools that help you move around (mobility aids) if they are needed. These include:  Canes.  Walkers.  Scooters.  Crutches.  Review your medicines with your doctor. Some medicines can make you feel dizzy. This can increase your chance of falling.  Ask your doctor what other things you can do to help prevent falls.  Where to find more information  Centers for Disease Control and PreventionNAVIN: https://cdc.gov  National [place] on Aging: https://pa1pomk.moisés.nih.gov  Contact a doctor if:  You are  afraid of falling at home.  You feel weak, drowsy, or dizzy at home.  You fall at home.  Summary  There are many simple things that you can do to make your home safe and to help prevent falls.  Ways to make your home safe include removing tripping hazards and installing grab bars in the bathroom.  Ask for help when making these changes in your home.  This information is not intended to replace advice given to you by your health care provider. Make sure you discuss any questions you have with your health care provider.  Document Released: 10/14/2010 Document Revised: 04/09/2020 Document Reviewed: 08/02/2018  [employer] Patient Education © 2020 [employer]      Depression / Suicide Risk    As you are discharged from this [facility] facility, it is important to learn how to keep safe from harming yourself.    Recognize the warning signs:  · Abrupt changes in personality, positive or negative- including increase in energy   · Giving away possessions  · Change in eating patterns- significant weight changes-  positive or negative  · Change in sleeping patterns- unable to sleep or sleeping all the time   · Unwillingness or inability to communicate  · Depression  · Unusual sadness, discouragement and loneliness  · Talk of wanting to die  · Neglect of personal appearance   · Rebelliousness- reckless behavior  · Withdrawal from people/activities they love  · Confusion- inability to concentrate     If you or a loved one observes any of these behaviors or has concerns about self-harm, here's what you can do:  · Talk about it- your feelings and reasons for harming yourself  · Remove any means that you might use to hurt yourself (examples: pills, rope, extension cords, firearm)  · Get professional help from the community (Mental Health, Substance Abuse, psychological counseling)  · Do not be alone:Call your Safe Contact- someone whom you trust who will be there for you.  · Call your local CRISIS HOTLINE 264-2550 or  188-905-4685  · Call your local Children's Mobile Crisis Response Team [place] (213) 931-7925 or www.Cor[employer]ateWorld.TapInko  · Call the toll free National Suicide Prevention Hotlines   · National Suicide Prevention Lifeline 514-515-VHQH (6138)  · National Isolation Network Line Network 800-SUICIDE (038-7504)

## 2021-03-05 NOTE — PROGRESS NOTES
Telemetry Shift Summary     Rhythm SR  HR Range 69  Ectopy RSR PVC 0  Measurements 0.16 / 0.12 / 0.44           Normal Values  Rhythm SR  HR Range    Measurements 0.12-0.20 / 0.06-0.10  / 0.30-0.52

## 2021-03-05 NOTE — PROGRESS NOTES
Patient discharge education completed with all questions answered. Regular diet and exercise reinforced. Patient verbalized understanding with t/b 100%. Patient stable with no signs of distress or acute needs at this time. Patient waiting for hosptial transport to lobby for discharge. Transport to take patient to ER lobby with all belongings. Patient daughter waiting in ER lobby to take patient home. Care relinquished.

## 2021-11-09 ENCOUNTER — OFFICE VISIT (OUTPATIENT)
Dept: URGENT CARE | Facility: CLINIC | Age: 81
End: 2021-11-09
Payer: MEDICARE

## 2021-11-09 VITALS
HEART RATE: 87 BPM | BODY MASS INDEX: 21.33 KG/M2 | WEIGHT: 128 LBS | OXYGEN SATURATION: 94 % | HEIGHT: 65 IN | RESPIRATION RATE: 16 BRPM | SYSTOLIC BLOOD PRESSURE: 132 MMHG | TEMPERATURE: 99.4 F | DIASTOLIC BLOOD PRESSURE: 70 MMHG

## 2021-11-09 DIAGNOSIS — R10.32 LLQ ABDOMINAL PAIN: ICD-10-CM

## 2021-11-09 PROCEDURE — 99215 OFFICE O/P EST HI 40 MIN: CPT | Performed by: PHYSICIAN ASSISTANT

## 2021-11-09 ASSESSMENT — ENCOUNTER SYMPTOMS
FLANK PAIN: 0
DIZZINESS: 0
ANOREXIA: 0
FEVER: 1
HEADACHES: 1
CONSTIPATION: 0
BLOOD IN STOOL: 0
NAUSEA: 0
VOMITING: 0
DIARRHEA: 0
BELCHING: 0
COUGH: 0
ABDOMINAL PAIN: 1
SHORTNESS OF BREATH: 0
CHILLS: 0

## 2021-11-09 ASSESSMENT — FIBROSIS 4 INDEX: FIB4 SCORE: 2.17

## 2021-11-09 NOTE — PROGRESS NOTES
Subjective     Amada Mcduffie is a 81 y.o. female who presents with Abdominal Pain ((L) side x2 weeks )            Abdominal Pain  This is a new problem. Episode onset: 2 weeks  The onset quality is sudden. The problem occurs constantly. The problem has been unchanged. The pain is located in the LLQ. The pain is at a severity of 5/10. The quality of the pain is aching. The abdominal pain does not radiate. Associated symptoms include a fever (low-grade) and headaches. Pertinent negatives include no anorexia, belching, constipation, diarrhea, dysuria, frequency, hematuria, nausea or vomiting. Exacerbated by: walking  The pain is relieved by nothing. She has tried nothing for the symptoms. There is no history of abdominal surgery.       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/2021    osteo-shouler and knees   • CATARACT     wesley IOL   • Fibromuscular dysplasia (HCC) 2018   • High cholesterol    • Hypertension    • Pain     pain in right shoulder   • PONV (postoperative nausea and vomiting)    • Renal disorder 02/10/2021    related to FMD   • Unspecified disorder of thyroid     hypothyroid   • Urinary bladder disorder     stress incontinence   • Urinary incontinence 02/10/2021       Past Surgical History:   Procedure Laterality Date   • PB RECONSTR TOTAL SHOULDER IMPLANT Right 2/24/2021    Procedure: ARTHROPLASTY, SHOULDER, TOTAL - REVERSE.;  Surgeon: Rober Lunsford M.D.;  Location: SURGERY SAME DAY [facility];  Service: Orthopedics   • CRANIOTOMY  1/1/2014    Performed by Melanie Pham M.D. 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    Allergies   Allergen Reactions   • Diltiazem Rash     Increased BP   • Morphine Vomiting and Rash   • Pcn  "[Penicillins] Rash     Rash throughout the body       Medications, Allergies, and current problem list reviewed today in Epic      Review of Systems   Constitutional: Positive for fever (low-grade). Negative for chills and malaise/fatigue.   Respiratory: Negative for cough and shortness of breath.    Cardiovascular: Negative for chest pain and leg swelling.   Gastrointestinal: Positive for abdominal pain. Negative for anorexia, blood in stool, constipation, diarrhea, nausea and vomiting.   Genitourinary: Negative for dysuria, flank pain, frequency, hematuria and urgency.   Skin: Negative for rash.   Neurological: Positive for headaches. Negative for dizziness.     All other systems reviewed and are negative.            Objective     /70 (BP Location: Right arm, Patient Position: Sitting, BP Cuff Size: Adult)   Pulse 87   Temp 37.4 °C (99.4 °F) (Temporal)   Resp 16   Ht 1.651 m (5' 5\")   Wt 58.1 kg (128 lb)   SpO2 94%   Breastfeeding No   BMI 21.30 kg/m²      Physical Exam  Constitutional:       General: She is not in acute distress.     Appearance: She is not ill-appearing.   HENT:      Head: Normocephalic and atraumatic.   Eyes:      General: No scleral icterus.     Conjunctiva/sclera: Conjunctivae normal.   Cardiovascular:      Rate and Rhythm: Normal rate and regular rhythm.   Pulmonary:      Effort: Pulmonary effort is normal. No respiratory distress.      Breath sounds: No wheezing.   Abdominal:      General: There is no distension.      Palpations: Abdomen is soft. There is no mass.      Tenderness: There is abdominal tenderness (marked TTP in LLQ) in the left lower quadrant. There is guarding. There is no right CVA tenderness, left CVA tenderness or rebound.   Skin:     General: Skin is warm and dry.   Neurological:      General: No focal deficit present.      Mental Status: She is alert and oriented to person, place, and time.   Psychiatric:         Mood and Affect: Mood normal.         " Behavior: Behavior normal.         Thought Content: Thought content normal.         Judgment: Judgment normal.                             Assessment & Plan        1. LLQ abdominal pain    Patient informed that she is inappropriate for the scope of service at [facility] due to concern for LLQ abdominal pain/ Diverticulitis.       Patient has Marked TTP in LLQ and elevated temperature. She demonstrates signs of acute abdomen. I do not have access to STAT labs or imaging in [facility]. Patient will need STAT labs to get CT of abdomen.  Therefore, I recommended she go to her local ED. She states she is going to try the walk-in clinic at her PCP office first.     The patient demonstrated a good understanding and agreed with the treatment plan.    Tanya Modi P.A.-C.

## (undated) DEVICE — SET EXTENSION WITH 2 PORTS (48EA/CA) ***PART #2C8610 IS A SUBSTITUTE*****

## (undated) DEVICE — SLING ORTH UNV TIETX VLFM ARM

## (undated) DEVICE — SLEEVE VASO CALF MED - (10PR/CA)

## (undated) DEVICE — Device

## (undated) DEVICE — DRILL 2.5MM

## (undated) DEVICE — HEAD HOLDER JUNIOR/ADULT

## (undated) DEVICE — BLADE SAW 90X25X1.37MM SAGITTAL DUAL CUT

## (undated) DEVICE — TOWELS CLOTH SURGICAL - (4/PK 20PK/CA)

## (undated) DEVICE — SODIUM CHL IRRIGATION 0.9% 1000ML (12EA/CA)

## (undated) DEVICE — HANDPIECE 10FT INTPLS SCT PLS IRRIGATION HAND CONTROL SET (6/PK)

## (undated) DEVICE — SUTURE GENERAL

## (undated) DEVICE — DRILL BOSS

## (undated) DEVICE — PACK TOTAL HIP - (1/CA)

## (undated) DEVICE — DRAPE SURGICAL U 77X120 - (10/CA)

## (undated) DEVICE — SET LEADWIRE 5 LEAD BEDSIDE DISPOSABLE ECG (1SET OF 5/EA)

## (undated) DEVICE — MASK ANESTHESIA ADULT  - (100/CA)

## (undated) DEVICE — TIP INTPLS HFLO ML ORFC BTRY - (12/CS)  FOR SURGILAV

## (undated) DEVICE — GLOVE BIOGEL INDICATOR SZ 8 SURGICAL PF LTX - (50/BX 4BX/CA)

## (undated) DEVICE — ELECTRODE DUAL RETURN W/ CORD - (50/PK)

## (undated) DEVICE — NEPTUNE 4 PORT MANIFOLD - (20/PK)

## (undated) DEVICE — SPONGE GAUZESTER 4 X 4 4PLY - (128PK/CA)

## (undated) DEVICE — SUTURE 2-0 MONOCRYL PLUS UNDYED CT-1 1 X 36 (36EA/BX)"

## (undated) DEVICE — SUTURE 5 TI-CRON HOS-14 - (36/BX)

## (undated) DEVICE — SENSOR SPO2 NEO LNCS ADHESIVE (20/BX) SEE USER NOTES

## (undated) DEVICE — GOWN WARMING STANDARD FLEX - (30/CA)

## (undated) DEVICE — ELECTRODE 850 FOAM ADHESIVE - HYDROGEL RADIOTRNSPRNT (50/PK)

## (undated) DEVICE — SUCTION INSTRUMENT YANKAUER BULBOUS TIP W/O VENT (50EA/CA)

## (undated) DEVICE — TUBING CLEARLINK DUO-VENT - C-FLO (48EA/CA)

## (undated) DEVICE — CANISTER SUCTION 3000ML MECHANICAL FILTER AUTO SHUTOFF MEDI-VAC NONSTERILE LF DISP  (40EA/CA)

## (undated) DEVICE — STAPLER SKIN DISP - (6/BX 10BX/CA) VISISTAT

## (undated) DEVICE — PROTECTOR ULNA NERVE - (36PR/CA)

## (undated) DEVICE — GLOVE BIOGEL SZ 7.5 SURGICAL PF LTX - (50PR/BX 4BX/CA)

## (undated) DEVICE — KIT ANESTHESIA W/CIRCUIT & 3/LT BAG W/FILTER (20EA/CA)

## (undated) DEVICE — DRAPE U ORTHOPEDIC - (10/BX)

## (undated) DEVICE — DRAPE STRLE REG TOWEL 18X24 - (10/BX 4BX/CA)"

## (undated) DEVICE — LACTATED RINGERS INJ 1000 ML - (14EA/CA 60CA/PF)

## (undated) DEVICE — CHLORAPREP 26 ML APPLICATOR - ORANGE TINT(25/CA)

## (undated) DEVICE — TUBE E-T HI-LO CUFF 7.0MM (10EA/PK)

## (undated) DEVICE — DRAPE U SPLIT IMP 54 X 76 - (24/CA)

## (undated) DEVICE — PIN GUIDE

## (undated) DEVICE — STOCKINETTE IMPERVIOUS 12X48 - STERILELF (10/CA)"

## (undated) DEVICE — SUTURE 0 ETHIBOND CT-1 - (12/BX) 18 INCH

## (undated) DEVICE — DRESSING XEROFORM 1X8 - (50/BX 4BX/CA)

## (undated) DEVICE — KIT EVACUATER 3 SPRING PVC LF 1/8 DRAIN SIZE (10EA/CA)"